# Patient Record
Sex: MALE | Race: WHITE | HISPANIC OR LATINO | Employment: UNEMPLOYED | URBAN - METROPOLITAN AREA
[De-identification: names, ages, dates, MRNs, and addresses within clinical notes are randomized per-mention and may not be internally consistent; named-entity substitution may affect disease eponyms.]

---

## 2022-02-04 ENCOUNTER — OFFICE VISIT (OUTPATIENT)
Dept: FAMILY MEDICINE CLINIC | Facility: CLINIC | Age: 32
End: 2022-02-04

## 2022-02-04 VITALS
BODY MASS INDEX: 31.86 KG/M2 | OXYGEN SATURATION: 99 % | RESPIRATION RATE: 18 BRPM | HEART RATE: 90 BPM | WEIGHT: 203 LBS | SYSTOLIC BLOOD PRESSURE: 126 MMHG | TEMPERATURE: 97.6 F | DIASTOLIC BLOOD PRESSURE: 84 MMHG | HEIGHT: 67 IN

## 2022-02-04 DIAGNOSIS — J45.30 MILD PERSISTENT ASTHMA WITHOUT COMPLICATION: ICD-10-CM

## 2022-02-04 DIAGNOSIS — L02.229 CARBUNCLE AND FURUNCLE OF TRUNK: Primary | ICD-10-CM

## 2022-02-04 DIAGNOSIS — L02.239 CARBUNCLE AND FURUNCLE OF TRUNK: Primary | ICD-10-CM

## 2022-02-04 DIAGNOSIS — M79.5 RETAINED BULLET: ICD-10-CM

## 2022-02-04 PROCEDURE — 87070 CULTURE OTHR SPECIMN AEROBIC: CPT | Performed by: NURSE PRACTITIONER

## 2022-02-04 PROCEDURE — 99204 OFFICE O/P NEW MOD 45 MIN: CPT | Performed by: NURSE PRACTITIONER

## 2022-02-04 PROCEDURE — 87205 SMEAR GRAM STAIN: CPT | Performed by: NURSE PRACTITIONER

## 2022-02-04 RX ORDER — DOXYCYCLINE HYCLATE 100 MG/1
100 CAPSULE ORAL EVERY 12 HOURS SCHEDULED
Qty: 28 CAPSULE | Refills: 0 | Status: SHIPPED | OUTPATIENT
Start: 2022-02-04 | End: 2022-02-18

## 2022-02-04 RX ORDER — CLINDAMYCIN PHOSPHATE 10 MG/G
GEL TOPICAL 2 TIMES DAILY
Qty: 30 G | Refills: 0 | Status: SHIPPED | OUTPATIENT
Start: 2022-02-04

## 2022-02-04 RX ORDER — GABAPENTIN 300 MG/1
300 CAPSULE ORAL 3 TIMES DAILY
Status: CANCELLED | OUTPATIENT
Start: 2022-02-04

## 2022-02-04 RX ORDER — ALBUTEROL SULFATE 90 UG/1
2 AEROSOL, METERED RESPIRATORY (INHALATION) EVERY 6 HOURS PRN
Qty: 18 G | Refills: 5 | Status: SHIPPED | OUTPATIENT
Start: 2022-02-04

## 2022-02-04 RX ORDER — FLUTICASONE PROPIONATE 44 MCG
2 AEROSOL WITH ADAPTER (GRAM) INHALATION 2 TIMES DAILY
Qty: 10.6 G | Refills: 2 | Status: SHIPPED | OUTPATIENT
Start: 2022-02-04

## 2022-02-04 RX ORDER — GABAPENTIN 300 MG/1
300 CAPSULE ORAL 3 TIMES DAILY PRN
Qty: 90 CAPSULE | Refills: 2 | Status: SHIPPED | OUTPATIENT
Start: 2022-02-04
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
## 2022-02-06 LAB
BACTERIA WND AEROBE CULT: NORMAL
GRAM STN SPEC: NORMAL

## 2022-02-07 NOTE — ASSESSMENT & PLAN NOTE
Historically well controlled on flovent and prn albuterol  Reorder the same  Advised to avoid triggers
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
## 2022-02-25 ENCOUNTER — TELEPHONE (OUTPATIENT)
Dept: DERMATOLOGY | Facility: CLINIC | Age: 32
End: 2022-02-25

## 2022-02-25 NOTE — TELEPHONE ENCOUNTER
Called to schedule consult from ASAP referral  Spoke with pt's mother who asked if she can call back later to schedule the appt  Confirmed our number with Diana

## 2022-04-28 ENCOUNTER — TELEPHONE (OUTPATIENT)
Dept: DERMATOLOGY | Facility: CLINIC | Age: 32
End: 2022-04-28

## 2022-05-24 ENCOUNTER — CONSULT (OUTPATIENT)
Dept: DERMATOLOGY | Facility: CLINIC | Age: 32
End: 2022-05-24
Payer: COMMERCIAL

## 2022-05-24 VITALS — TEMPERATURE: 97.7 F | WEIGHT: 183 LBS | BODY MASS INDEX: 28.72 KG/M2 | HEIGHT: 67 IN

## 2022-05-24 DIAGNOSIS — L02.229 CARBUNCLE AND FURUNCLE OF TRUNK: ICD-10-CM

## 2022-05-24 DIAGNOSIS — L02.239 CARBUNCLE AND FURUNCLE OF TRUNK: ICD-10-CM

## 2022-05-24 DIAGNOSIS — L70.1 ACNE CONGLOBATA: Primary | ICD-10-CM

## 2022-05-24 DIAGNOSIS — Z79.899 ON ACCUTANE THERAPY: ICD-10-CM

## 2022-05-24 PROCEDURE — 99243 OFF/OP CNSLTJ NEW/EST LOW 30: CPT | Performed by: DERMATOLOGY

## 2022-05-24 RX ORDER — DOXYCYCLINE 50 MG/1
50 TABLET ORAL 2 TIMES DAILY
COMMUNITY

## 2022-05-24 RX ORDER — PREDNISONE 20 MG/1
20 TABLET ORAL DAILY
Qty: 30 TABLET | Refills: 0 | Status: SHIPPED | OUTPATIENT
Start: 2022-05-24 | End: 2022-06-23

## 2022-05-24 NOTE — PATIENT INSTRUCTIONS
ACNE CONGLOBATA    Treatment plan:Based on a thorough discussion of this condition and the management approach to it (including a comprehensive discussion of the known risks, side effects and potential benefits of treatment), the patient (family) agrees to implement the following specific plan:    Oral medications:        Morning or Night: Start Prednisone 20 mg once a day by mouth for 1 month           Morning and Night: Start Isotretinoin 10 mg once a day by mouth for 1 month     Follow up in 1 month   Patient is aware a prior authorization is required for (istotretinoin)

## 2022-05-26 DIAGNOSIS — L02.229 CARBUNCLE AND FURUNCLE OF TRUNK: Primary | ICD-10-CM

## 2022-05-26 DIAGNOSIS — L02.239 CARBUNCLE AND FURUNCLE OF TRUNK: Primary | ICD-10-CM

## 2022-05-26 DIAGNOSIS — L70.1 ACNE CONGLOBATA: ICD-10-CM

## 2022-05-26 DIAGNOSIS — Z79.899 ON ACCUTANE THERAPY: ICD-10-CM

## 2022-05-26 NOTE — TELEPHONE ENCOUNTER
Prior auth started for isotretinoin (brand)  Fax with clinicals faxed to [address] [address]  [phone] and [phone]

## 2022-05-27 NOTE — TELEPHONE ENCOUNTER
Called [facility] [phone] check status on prior auth for isotretinoin  Spoke with Faviola Bangura prior auth reopened to include Myorisan as an option since absorica is not covered   He stated it can take up to 24 hours for review

## 2022-06-01 NOTE — TELEPHONE ENCOUNTER
Fax recd 5/27   Gildardo Griffith approved from 5/27/22-5/27/23     Detailed msg left on patient phone as well   Also informed him the medication will be sent to [facility] later on today tomorrow morning the latest

## 2022-06-03 ENCOUNTER — APPOINTMENT (OUTPATIENT)
Dept: LAB | Facility: HOSPITAL | Age: 32
End: 2022-06-03
Payer: COMMERCIAL

## 2022-06-06 DIAGNOSIS — L70.1 ACNE CONGLOBATA: Primary | ICD-10-CM

## 2022-06-06 RX ORDER — ISOTRETINOIN 20 MG/1
20 CAPSULE ORAL DAILY
Qty: 30 CAPSULE | Refills: 0 | Status: SHIPPED | OUTPATIENT
Start: 2022-06-06 | End: 2022-07-11

## 2022-06-06 NOTE — TELEPHONE ENCOUNTER
Spoke with pt mom Diana (consent on file) informed her the medication should be ready for   If there is an issue to let us know  She also stated the prednisone has been clearing his skin and wanted to know what you meant when you said it will get worse before it gets better     -explained to mom it was just for reassurance the worsening of the breakouts do not happen to everyone     Mom understood

## 2022-06-27 NOTE — TELEPHONE ENCOUNTER
Mother called in asking for an update again on the PA for the medication  Pharmacy keeps telling her to call the office  Please call mother and update, Thank you

## 2022-06-29 NOTE — TELEPHONE ENCOUNTER
Patients mother called in regards to patients Accutane medication stating thathe has not been able to get the medication from the pharmacy and would like to cancel the appointment  I did state that prior authorization was approved on our end and that it should be able to be picked up to verify the pharmacy has medication on hand if not we may need to send to another pharmacy  I did advise it was okay to cancel appointment since he did not start medication but would need to be seen next month  Mother confirmed understanding

## 2022-07-11 DIAGNOSIS — L70.1 ACNE CONGLOBATA: Primary | ICD-10-CM

## 2022-07-11 RX ORDER — ISOTRETINOIN 10 MG/1
10 CAPSULE, LIQUID FILLED ORAL DAILY
Qty: 30 CAPSULE | Refills: 0 | Status: SHIPPED | OUTPATIENT
Start: 2022-07-11 | End: 2022-08-10

## 2022-07-11 RX ORDER — PREDNISONE 10 MG/1
10 TABLET ORAL DAILY
Qty: 30 TABLET | Refills: 1 | Status: SHIPPED | OUTPATIENT
Start: 2022-07-11 | End: 2022-10-14 | Stop reason: SDUPTHER

## 2022-07-11 NOTE — TELEPHONE ENCOUNTER
Mother called again today and lvm:  Hi, yes, this is Diana total  I'm calling for [employer]  I really need someone to call me back at [phone]  It's regarding the medication  I called the insurance  Now the insurance tells me that I got to call the Doctor  So it's been going on two months already  We need to do something about this because he really needs to get on his medication because someone please call them on something back at [phone]  Thank you

## 2022-07-11 NOTE — TELEPHONE ENCOUNTER
Called  pharmacy and spoke with pharmacist who also verified they are getting a denial after processing myorisan  Called [facility] at 1 [phone] who stated the approval was for myorisan 10 mg daily and the prescription they are running through is for 20 mg   Dr Keisha Francisco can you sign off on the prescription on file   Updated the prescription based on chart note as well to myorisan 10 mg daily

## 2022-07-12 NOTE — TELEPHONE ENCOUNTER
Spoke with mom and informed her the prescription is ready for  as well as the prednisone   Appts also rescheduled

## 2022-08-22 ENCOUNTER — TELEPHONE (OUTPATIENT)
Dept: DERMATOLOGY | Age: 32
End: 2022-08-22

## 2022-08-22 NOTE — TELEPHONE ENCOUNTER
Called and spoke with mom  Made aware the visit for 8/24 will be virtual and not in person due to insurance   Mom understood

## 2022-08-24 ENCOUNTER — TELEPHONE (OUTPATIENT)
Dept: DERMATOLOGY | Age: 32
End: 2022-08-24

## 2022-08-24 NOTE — TELEPHONE ENCOUNTER
Call patient for virtual appointment was told by mother that patient was unavailable for vv appt he was seeing his  and can't talk on phone  Pt mother stated that they will call back to reschedule Accutane appt

## 2022-09-16 ENCOUNTER — OFFICE VISIT (OUTPATIENT)
Dept: DERMATOLOGY | Facility: CLINIC | Age: 32
End: 2022-09-16
Payer: COMMERCIAL

## 2022-09-16 DIAGNOSIS — L73.0 ACNE SCARRING: ICD-10-CM

## 2022-09-16 DIAGNOSIS — Z79.899 HIGH RISK MEDICATION USE: ICD-10-CM

## 2022-09-16 DIAGNOSIS — L02.229 CARBUNCLE AND FURUNCLE OF TRUNK: ICD-10-CM

## 2022-09-16 DIAGNOSIS — L70.1 ACNE CONGLOBATA: Primary | ICD-10-CM

## 2022-09-16 DIAGNOSIS — L85.3 XEROSIS OF SKIN: ICD-10-CM

## 2022-09-16 DIAGNOSIS — L02.239 CARBUNCLE AND FURUNCLE OF TRUNK: ICD-10-CM

## 2022-09-16 PROCEDURE — 99213 OFFICE O/P EST LOW 20 MIN: CPT | Performed by: DERMATOLOGY

## 2022-09-16 RX ORDER — BUPROPION HYDROCHLORIDE 75 MG/1
TABLET ORAL
COMMUNITY
Start: 2022-09-08

## 2022-09-16 RX ORDER — PRAZOSIN HYDROCHLORIDE 2 MG/1
CAPSULE ORAL
COMMUNITY
Start: 2022-09-08

## 2022-09-16 RX ORDER — ISOTRETINOIN 10 MG/1
10 CAPSULE, LIQUID FILLED ORAL DAILY
Qty: 30 CAPSULE | Refills: 0 | Status: CANCELLED | OUTPATIENT
Start: 2022-09-16 | End: 2022-10-16

## 2022-09-16 RX ORDER — BUSPIRONE HYDROCHLORIDE 10 MG/1
TABLET ORAL
COMMUNITY
Start: 2022-09-08

## 2022-09-16 NOTE — PROGRESS NOTES
[facility] Follow Up Note    Patient Name: Soledad Lynn  Encounter Date: 09/16/22      Today's Chief Concerns:  Noelle Horvath Concern #1:  Isotretinoin       Current Medications:    Current Outpatient Medications:     albuterol (Ventolin HFA) 90 mcg/act inhaler, Inhale 2 puffs every 6 (six) hours as needed for wheezing, Disp: 18 g, Rfl: 5    buPROPion (WELLBUTRIN) 75 mg tablet, , Disp: , Rfl:     busPIRone (BUSPAR) 10 mg tablet, , Disp: , Rfl:     fluticasone (Flovent HFA) 44 mcg/act inhaler, Inhale 2 puffs 2 (two) times a day Rinse mouth after use  (Patient not taking: Reported on 5/24/2022), Disp: 10 6 g, Rfl: 2    gabapentin (Neurontin) 300 mg capsule, Take 1 capsule (300 mg total) by mouth 3 (three) times a day as needed (pain) (Patient not taking: Reported on 5/24/2022), Disp: 90 capsule, Rfl: 2    Myorisan 10 MG capsule, Take 1 capsule (10 mg total) by mouth daily, Disp: 30 capsule, Rfl: 0    prazosin (MINIPRESS) 2 mg capsule, , Disp: , Rfl:     predniSONE 10 mg tablet, Take 1 tablet (10 mg total) by mouth daily, Disp: 30 tablet, Rfl: 1    CONSTITUTIONAL:   There were no vitals filed for this visit  Specific Alerts:    Have you been seen by a [facility]atologist in the last 3 years? YES    No Known Allergies    May we call your Preferred Phone number to discuss your specific medical information? YES    May we leave a detailed message that includes your specific medical information? YES    Have you traveled outside of the [facility] in the past 3 months? No    Do you currently have a pacemaker or defibrillator? No    Do you have any artificial heart valves, joints, plates, screws, rods, stents, pins, etc? YES    Do you require any medications prior to a surgical procedure? No    Are you taking any medications that cause you to bleed more easily ("blood thinners") No    Have you ever experienced a rapid heartbeat with epinephrine?  No      Review of Systems:  Have you recently had or currently have any of the following? · Fever or chills: No  · Night Sweats: No  · Headaches: No  · Weight Gain: No  · Weight Loss: No  · Blurry Vision: No  · Nausea: No  · Vomiting: No  · Diarrhea: No  · Blood in Stool: No  · Abdominal Pain: No  · Itchy Skin: YES  · Painful Joints: No  · Swollen Joints: No  · Muscle Pain: No  · Irregular Mole: No  · Sun Burn: No  · Dry Skin: YES  · Skin Color Changes: No  · Scar or Keloid: No  · Cold Sores/Fever Blisters: No  · Bacterial Infections/MRSA: No  · Anxiety: No  · Depression: No  · Suicidal or Homicidal Thoughts: No      PSYCH: Normal mood and affect  EYES: Normal conjunctiva  ENT: Normal lips and oral mucosa  CARDIOVASCULAR: No edema  RESPIRATORY: Normal respirations  HEME/LYMPH/IMMUNO:  No regional lymphadenopathy except as noted below in ASSESSMENT AND PLAN BY DIAGNOSIS    FULL ORGAN SYSTEM SKIN EXAM (SKIN)   Hair, Scalp, Ears, Face Normal except as noted below in Assessment   Neck, Cervical Chain Nodes Normal except as noted below in Assessment   Right Arm/Hand/Fingers Normal except as noted below in Assessment   Left Arm/Hand/Fingers Normal except as noted below in Assessment   Chest/Breasts/Axillae Viewed areas Normal except as noted below in Assessment                           ISOTRETINOIN "ACCUTANE": MALE    Age:32 y o    Weight:     Ipledge number: [id number]   Last 4 digits SS: [id number]      "Goal Dose" in mg (125 mg x weight in kg): 10,375 mg       Interim month   "Daily Dose" of Isotretinoin the patient has actually been taking since last visit (this is usually what was prescribed): 10 mg   "Total # of Days" patient took Isotretinoin since last visit (this is usually 30):  30 days   "Total Monthly Dose" in mg since last visit (this equals "Daily Dose" x "Total # of Days"): 300 mg    Cumulative dosage   "Total cumulative Dose" in mg (add the above "Total Monthly Dose" with "Total Cumulative Dose" from last visit: 300 mg        Symptoms   Conjunctivitis: No   Night Blindness/ Issues with night vision: No   Focusing Problems: No   Dry Lips/Eyes: No   Dry Skin: No   Rash: No   Nose Bleeds: No   Angular cheilitis (cracking in corner of lips): No   Headaches: No   Photosensitivity: No   Dry Anus: No   Depression: No   Mood Changes: No   Suicidal thoughts: No   Fatigue: No   Weight Loss: No   Muscle Pain/Stiffness: No   Abdominal pain: No   Diarrhea: No   Bloody stools: No         Physical Exam   Psychiatric/Mood: Normal   Anatomic Location Affected:  Face; chest and back    Morphological Description:                      -    Pertinent Positives:   Pertinent Negatives:       Labs   Date of last labs: 06/03/2022   Completed: YES   Labs reviewed: within normal limits      Additional History of Present Condition:  Patient is present to re- establish isotretinoin treatment   Has been on prednisone 10 mg, possibly intermittently  DIAGNOSES:     Acne Vulgaris   High Risk Medication   Medication Monitoring   Xerosis (see below for patient education)    Assessment and Plan:   Target Total Dose per KILOgram:  125 mg/KILOgram (this may change this on a patient-by-patient basis)   Planned daily dose for next 30 days: 10 mg   Return to clinic in about 1 month, please review ipledge guidelines in terms of timing (see below for patient education)   Get labs in 2 weeks then 1-2 days before next appointment: YES    Patient confirmed in iPledge: YES   Patients counseled:  - Cannot donate blood while taking isotretinoin and for 1 month after completing therapy  YES  - Do not share medication with anyone   YES  - Possible side effects discussed, including sun sensitivity, dry lips/eyes/skin, headaches, blurry vision (pseudotumor cerebri), muscle/joint aches, GI upset, bloody stools (IBD including Crohns/Ulcerative Colitis), jaundice, liver dysfunction, lipid abnormalities, bone marrow dysfunction, mood effects, thoughts of hurting oneself or others, and flaring of acne (acne fulminans/SAPPHO)  YES  - Report any side effects of mood swings or depression and stop medication upon occurrence  YES      ORAL ISOTRETINOIN (used to be called the brand name Charlottelaurence Pozo)  Isotretinoin, a derivative of vitamin A, is a powerful drug with several significant potential side effects  It is reserved for acne which is severe or when other medications have not worked well enough  It used to be sold under the brand name AldPetbrosiar but now several versions exist     Isotretinoin for Acne   Isotretinoin, a derivative of vitamin A, is a retinoid medication that is taken by mouth to treat severe nodular acne  Typically, it is used once other acne treatments have not worked, such as oral antibiotics  Isotretinoin is powerful drug with several significant potential side effects  It used to be sold under the brand name AldPetbrosiar but now several versions exist  Usually isotretinoin is taken for 4 to 6 months, although the length of treatment can vary from person to person  While most patients acne improves and may even clear with this medication, in 20% of patients acne can come back  This requires additional acne treatment or even a second cycle of isotretinoin  How should I take isotretinoin?  Isotretinoin dosing is weight-based and should be taken exactly as prescribed   If you miss a dose, skip that dose  Do not take 2 doses at the same time   Take with food to help with absorption   All instructions in the iPLEDGE program packet (www [employer]) that was provided must be followed (see below for highlights)   You will get a 30 day supply of isotretinoin at a time  It cannot be automatically refilled  Make certain that you have been given enough medication to last 30 days as pharmacists are unable to refill or make changes within a month     You must return to your dermatologist every month to make sure you are not having any serious side effects from isotretinoin  For female patients, this visit will always include a monthly pregnancy test  Other laboratory studies, including liver function tests, cholesterol and triglycerides, must also be conducted before and during treatment  What should I avoid while taking isotretinoin?  Do not donate blood while take isotretinoin or until 1 months after coming off the medication   Do not have cosmetic procedures to smooth your skin, including waxing, dermabrasion, or laser procedures, while taking this medication and for at least 6 months after you stop   Do not share isotretinoin with any other people  It can cause birth defects and other serious health problems   Do not use any other acne medications, including medicated washes, cleansers, creams or antibiotic pills during your treatment with isotretinoin unless expressly directed to by your dermatologist      Initiating isotretinoin & the iPLEDGE Program    The iPLEDGE Program is a strict, government-required program to prevent females from becoming pregnant while on isotretinoin  All females and males must participate  Note: Your provider must follow this program and cannot change any of the requirements   Before starting isotretinoin, your provider will talk to you about the safe use of this medication and you will need to sign consent forms in order to receive treatment   If you fail to keep appointments, you will be unable to get your prescription filled   For male patients and women of non-childbearing age: There is no waiting period  Once laboratory tests are done, treatment can start   For more information, visit: [email or web address]    What are the possible side effects of isotretinoin? What should I do? Most side effects from isotretinoin are mild and can be easily improved with simple remedies  Others are more concerning      Dry skin and eyes, chapped lips and dry nose that may lead to nosebleeds  o Dry Skin: Apply sensitive skin moisturizers to dry skin at least two times a day  You may need sunscreen (SPF 30) in the morning and to reapply when outside  o Dry Eyes: Use saline eye drops or artificial tears  o Dry Nose/Nosebleeds: Use saline nasal spray (ex  Ayr) during the day and at bedtime  To stop nosebleeds, hold pressure  If this does not work, call your dermatologist     o Chapped lips: apply petrolatum-based lip balms routinely  Avoid anything medicated   Contact your dermatologist for excessive dryness, cracks, tenderness or pain   Increased blood fats and cholesterol (usually in patients with a personal or family history of cholesterol or triglyceride problems)   Vision problems affecting your ability to see in the dark and drive at night   Bone, muscle and tendon aches can occur  Additional stretching before and after activities may help relieve aches  If you are otherwise healthy, consider the use of ibuprofen or naproxen  If you are unsure if you can use these pain medications, ask your doctor first  Also, call your doctor if you experience severe back pain, joint pain, or a broken bone    Changes in mood, including anxiety, depressive symptoms or suicidal thoughts which may or may not be temporary  Notify your doctor if your or a family member have suffered from these conditions or if you have any concerns during treatment   Serious birth defects or miscarriage can occur while taking this medication and for one month after taking your last dose of isotretinoin  You must not get pregnant while taking isotretinoin  Once the medication is out of your system, 30 days, there is no effect on the baby   Increased pressure in the brain  Call your doctor right away if you experience bad headache, blurred vision, dizziness, nausea or vomiting, or seizures      Skin rash - call your doctor right away if you develop any rashes or blisters on the skin   Liver damage - call your doctor right away if you experience severe stomach, chest or bowel pain, painful swallowing, diarrhea, blood in your stool, yellowing of your skin or eyes, or dark urine   Gastrointestinal bleeding  If you experience unusual abdominal pain or red or black/tarry stools, call your doctor immediately  You should also notify your doctor if you or a family member has a history of ulcerative colitis or Crohns disease   Worsening acne  Mild worsening of acne can occur in the first few weeks of using isotreinoin  If your acne is getting significantly worse, call your doctor  This may require temporarily stopping the isotretinoin and possibly adding other medications  XEROSIS ("DRY SKIN")    Dry skin refers to skin that feels dry to touch  Dry skin has a dull surface with a rough, scaly quality  The skin is less pliable and cracked  When dryness is severe, the skin may become inflamed and fissured  Although any body site can be dry, dry skin tends to affect the shins more than any other site  Dry skin is lacking moisture in the outer horny cell layer (stratum corneum) and this results in cracks in the skin surface  Dry skin is also called xerosis, xeroderma or asteatosis (lack of fat)  It can affect males and females of all ages  There is some racial variability in water and lipid content of the skin   Dry skin that starts in early childhood may be one of about 20 types of ichthyosis (fish-scale skin)  There is often a family history of dry skin   Dry skin is commonly seen in people with atopic dermatitis   Nearly everyone > 60 years has dry skin  Dry skin that begins later may be seen in people with certain diseases and conditions     Postmenopausal women   Hypothyroidism   Chronic renal disease    Malnutrition and weight loss    Subclinical dermatitis    Treatment with certain drugs such as oral retinoids, diuretics and epidermal growth factor receptor inhibitors    People exposed to a dry environment may experience dry skin   Low humidity: in desert climates or cool, windy conditions    Excessive air conditioning    Direct heat from a fire or fan heater    Excessive bathing    Contact with soap, detergents and solvents    Inappropriate topical agents such as alcohol    Frictional irritation from rough clothing or abrasives    Dry skin is due to abnormalities in the integrity of the barrier function of the stratum corneum, which is made up of corneocytes   There is an overall reduction in the lipids in the stratum corneum   Ratio of ceramides, cholesterol and free fatty acids may be normal or altered   There may be a reduction in the proliferation of keratinocytes   Keratinocyte subtypes change in dry skin with a decrease in keratins K1, K10 and increase in K5, K14     Involucrin (a protein) may be expressed early, increasing cell stiffness   The result is retention of corneocytes and reduced water-holding capacity  What is the treatment for dry skin? The mainstay of treatment of dry skin and ichthyosis is moisturisers/emollients  They should be applied liberally and often enough to:   Reduce itch    Improve the barrier function    Prevent entry of irritants, bacteria    Reduce transepidermal water loss  When considering which emollient is most suitable, consider:   Severity of the dryness    Tolerance    Personal preference    Cost and availability  Emollients generally work best if applied to damp skin, if pH is below 7 (acidic), and if containing humectants such as urea or propylene glycol  Additional treatments include:   Topical steroid if itchy or there is dermatitis -- choose an emollient base    Topical calcineurin inhibitors if topical steroids are unsuitable  How can dry skin be prevented? Eliminate aggravating factors:   Reduce the frequency of bathing      A humidifier in winter and air conditioner in summer    Compare having a short shower with a prolonged soak in a bath   Use lukewarm, not hot, water   Replace standard soap with a substitute such as a synthetic detergent cleanser, water-miscible emollient, bath oil, anti-pruritic tar oil, colloidal oatmeal etc     Apply an emollient liberally and often, particularly shortly after bathing, and when itchy  The drier the skin, the thicker this should be, especially on the hands  What is the outlook for dry skin? A tendency to dry skin may persist life-long, or it may improve once contributing factors are controlled      Scribe Attestation    I,:  Angeles Rasmussen MA am acting as a scribe while in the presence of the attending physician :       I,:  Everton Lazcano MD personally performed the services described in this documentation    as scribed in my presence :

## 2022-09-16 NOTE — PATIENT INSTRUCTIONS
Assessment and Plan:  Target Total Dose per KILOgram:  125 mg/KILOgram (this may change this on a patient-by-patient basis)  Planned daily dose for next 30 days: 10 mg  Return to clinic in about 1 month, please review ipledge guidelines in terms of timing (see below for patient education)  Get labs in 2 weeks then 1-2 days before next appointment: YES   Patient confirmed in iPledge: YES  Patients counseled:  Cannot donate blood while taking isotretinoin and for 1 month after completing therapy  YES  Do not share medication with anyone  YES  Possible side effects discussed, including sun sensitivity, dry lips/eyes/skin, headaches, blurry vision (pseudotumor cerebri), muscle/joint aches, GI upset, bloody stools (IBD including Crohns/Ulcerative Colitis), jaundice, liver dysfunction, lipid abnormalities, bone marrow dysfunction, mood effects, thoughts of hurting oneself or others, and flaring of acne (acne fulminans/SAPPHO)  YES  Report any side effects of mood swings or depression and stop medication upon occurrence  YES      ORAL ISOTRETINOIN (used to be called the brand name Shopperception)  Isotretinoin, a derivative of vitamin A, is a powerful drug with several significant potential side effects  It is reserved for acne which is severe or when other medications have not worked well enough  It used to be sold under the brand name [employer] but now several versions exist     Isotretinoin for Acne   Isotretinoin, a derivative of vitamin A, is a retinoid medication that is taken by mouth to treat severe nodular acne  Typically, it is used once other acne treatments have not worked, such as oral antibiotics  Isotretinoin is powerful drug with several significant potential side effects  It used to be sold under the brand name [employer] but now several versions exist  Usually isotretinoin is taken for 4 to 6 months, although the length of treatment can vary from person to person    While most patients acne improves and may even clear with this medication, in 20% of patients acne can come back  This requires additional acne treatment or even a second cycle of isotretinoin  How should I take isotretinoin? Isotretinoin dosing is weight-based and should be taken exactly as prescribed  If you miss a dose, skip that dose  Do not take 2 doses at the same time  Take with food to help with absorption  All instructions in the iPLEDGE program packet (www [employer]) that was provided must be followed (see below for highlights)  You will get a 30 day supply of isotretinoin at a time  It cannot be automatically refilled  Make certain that you have been given enough medication to last 30 days as pharmacists are unable to refill or make changes within a month  You must return to your dermatologist every month to make sure you are not having any serious side effects from isotretinoin  For female patients, this visit will always include a monthly pregnancy test  Other laboratory studies, including liver function tests, cholesterol and triglycerides, must also be conducted before and during treatment  What should I avoid while taking isotretinoin? Do not donate blood while take isotretinoin or until 1 months after coming off the medication  Do not have cosmetic procedures to smooth your skin, including waxing, dermabrasion, or laser procedures, while taking this medication and for at least 6 months after you stop  Do not share isotretinoin with any other people  It can cause birth defects and other serious health problems  Do not use any other acne medications, including medicated washes, cleansers, creams or antibiotic pills during your treatment with isotretinoin unless expressly directed to by your dermatologist      Initiating isotretinoin & the iPLEDGE Program   The [facility] is a strict, government-required program to prevent females from becoming pregnant while on isotretinoin   All females and males must participate  Note: Your provider must follow this program and cannot change any of the requirements  Before starting isotretinoin, your provider will talk to you about the safe use of this medication and you will need to sign consent forms in order to receive treatment  If you fail to keep appointments, you will be unable to get your prescription filled  For male patients and women of non-childbearing age: There is no waiting period  Once laboratory tests are done, treatment can start  For more information, visit: [email or web address]    What are the possible side effects of isotretinoin? What should I do? Most side effects from isotretinoin are mild and can be easily improved with simple remedies  Others are more concerning  Dry skin and eyes, chapped lips and dry nose that may lead to nosebleeds  Dry Skin: Apply sensitive skin moisturizers to dry skin at least two times a day  You may need sunscreen (SPF 30) in the morning and to reapply when outside  Dry Eyes: Use saline eye drops or artificial tears  Dry Nose/Nosebleeds: Use saline nasal spray (ex  Ayr) during the day and at bedtime  To stop nosebleeds, hold pressure  If this does not work, call your dermatologist     Chapped lips: apply petrolatum-based lip balms routinely  Avoid anything medicated   Contact your dermatologist for excessive dryness, cracks, tenderness or pain  Increased blood fats and cholesterol (usually in patients with a personal or family history of cholesterol or triglyceride problems)  Vision problems affecting your ability to see in the dark and drive at night  Bone, muscle and tendon aches can occur  Additional stretching before and after activities may help relieve aches  If you are otherwise healthy, consider the use of ibuprofen or naproxen    If you are unsure if you can use these pain medications, ask your doctor first  Also, call your doctor if you experience severe back pain, joint pain, or a broken bone   Changes in mood, including anxiety, depressive symptoms or suicidal thoughts which may or may not be temporary  Notify your doctor if your or a family member have suffered from these conditions or if you have any concerns during treatment  Serious birth defects or miscarriage can occur while taking this medication and for one month after taking your last dose of isotretinoin  You must not get pregnant while taking isotretinoin  Once the medication is out of your system, 30 days, there is no effect on the baby  Increased pressure in the brain  Call your doctor right away if you experience bad headache, blurred vision, dizziness, nausea or vomiting, or seizures  Skin rash - call your doctor right away if you develop any rashes or blisters on the skin  Liver damage - call your doctor right away if you experience severe stomach, chest or bowel pain, painful swallowing, diarrhea, blood in your stool, yellowing of your skin or eyes, or dark urine  Gastrointestinal bleeding  If you experience unusual abdominal pain or red or black/tarry stools, call your doctor immediately  You should also notify your doctor if you or a family member has a history of ulcerative colitis or Crohns disease  Worsening acne  Mild worsening of acne can occur in the first few weeks of using isotreinoin  If your acne is getting significantly worse, call your doctor  This may require temporarily stopping the isotretinoin and possibly adding other medications  XEROSIS ("DRY SKIN")    Dry skin refers to skin that feels dry to touch  Dry skin has a dull surface with a rough, scaly quality  The skin is less pliable and cracked  When dryness is severe, the skin may become inflamed and fissured  Although any body site can be dry, dry skin tends to affect the shins more than any other site      Dry skin is lacking moisture in the outer horny cell layer (stratum corneum) and this results in cracks in the skin surface  Dry skin is also called xerosis, xeroderma or asteatosis (lack of fat)  It can affect males and females of all ages  There is some racial variability in water and lipid content of the skin  Dry skin that starts in early childhood may be one of about 20 types of ichthyosis (fish-scale skin)  There is often a family history of dry skin  Dry skin is commonly seen in people with atopic dermatitis  Nearly everyone > 60 years has dry skin  Dry skin that begins later may be seen in people with certain diseases and conditions  Postmenopausal women  Hypothyroidism  Chronic renal disease   Malnutrition and weight loss   Subclinical dermatitis   Treatment with certain drugs such as oral retinoids, diuretics and epidermal growth factor receptor inhibitors    People exposed to a dry environment may experience dry skin  Low humidity: in desert climates or cool, windy conditions   Excessive air conditioning   Direct heat from a fire or fan heater   Excessive bathing   Contact with soap, detergents and solvents   Inappropriate topical agents such as alcohol   Frictional irritation from rough clothing or abrasives    Dry skin is due to abnormalities in the integrity of the barrier function of the stratum corneum, which is made up of corneocytes  There is an overall reduction in the lipids in the stratum corneum  Ratio of ceramides, cholesterol and free fatty acids may be normal or altered  There may be a reduction in the proliferation of keratinocytes  Keratinocyte subtypes change in dry skin with a decrease in keratins K1, K10 and increase in K5, K14  Involucrin (a protein) may be expressed early, increasing cell stiffness  The result is retention of corneocytes and reduced water-holding capacity  What is the treatment for dry skin? The mainstay of treatment of dry skin and ichthyosis is moisturisers/emollients   They should be applied liberally and often enough to:  Reduce itch   Improve the barrier function   Prevent entry of irritants, bacteria   Reduce transepidermal water loss  When considering which emollient is most suitable, consider:  Severity of the dryness   Tolerance   Personal preference   Cost and availability  Emollients generally work best if applied to damp skin, if pH is below 7 (acidic), and if containing humectants such as urea or propylene glycol  Additional treatments include:  Topical steroid if itchy or there is dermatitis -- choose an emollient base   Topical calcineurin inhibitors if topical steroids are unsuitable  How can dry skin be prevented? Eliminate aggravating factors:  Reduce the frequency of bathing  A humidifier in winter and air conditioner in summer   Compare having a short shower with a prolonged soak in a bath  Use lukewarm, not hot, water  Replace standard soap with a substitute such as a synthetic detergent cleanser, water-miscible emollient, bath oil, anti-pruritic tar oil, colloidal oatmeal etc    Apply an emollient liberally and often, particularly shortly after bathing, and when itchy  The drier the skin, the thicker this should be, especially on the hands  What is the outlook for dry skin? A tendency to dry skin may persist life-long, or it may improve once contributing factors are controlled

## 2022-09-19 ENCOUNTER — APPOINTMENT (OUTPATIENT)
Dept: LAB | Facility: HOSPITAL | Age: 32
End: 2022-09-19
Attending: DERMATOLOGY
Payer: COMMERCIAL

## 2022-09-19 DIAGNOSIS — Z79.899 HIGH RISK MEDICATION USE: ICD-10-CM

## 2022-09-19 DIAGNOSIS — L85.3 XEROSIS OF SKIN: ICD-10-CM

## 2022-09-19 DIAGNOSIS — L70.1 ACNE CONGLOBATA: ICD-10-CM

## 2022-09-19 LAB
ALBUMIN SERPL BCP-MCNC: 4 G/DL (ref 3.5–5)
ALP SERPL-CCNC: 85 U/L (ref 43–122)
ALT SERPL W P-5'-P-CCNC: 10 U/L
ANION GAP SERPL CALCULATED.3IONS-SCNC: 8 MMOL/L (ref 5–14)
AST SERPL W P-5'-P-CCNC: 20 U/L (ref 17–59)
BASOPHILS # BLD AUTO: 0.03 THOUSANDS/ΜL (ref 0–0.1)
BASOPHILS NFR BLD AUTO: 0 % (ref 0–1)
BILIRUB SERPL-MCNC: 0.38 MG/DL (ref 0.2–1)
BUN SERPL-MCNC: 10 MG/DL (ref 5–25)
CALCIUM SERPL-MCNC: 9 MG/DL (ref 8.4–10.2)
CHLORIDE SERPL-SCNC: 105 MMOL/L (ref 96–108)
CHOLEST SERPL-MCNC: 117 MG/DL
CO2 SERPL-SCNC: 28 MMOL/L (ref 21–32)
CREAT SERPL-MCNC: 0.76 MG/DL (ref 0.7–1.5)
EOSINOPHIL # BLD AUTO: 0.22 THOUSAND/ΜL (ref 0–0.61)
EOSINOPHIL NFR BLD AUTO: 2 % (ref 0–6)
ERYTHROCYTE [DISTWIDTH] IN BLOOD BY AUTOMATED COUNT: 15.4 % (ref 11.6–15.1)
GFR SERPL CREATININE-BSD FRML MDRD: 122 ML/MIN/1.73SQ M
GLUCOSE P FAST SERPL-MCNC: 104 MG/DL (ref 70–99)
HCT VFR BLD AUTO: 41 % (ref 36.5–49.3)
HDLC SERPL-MCNC: 24 MG/DL
HGB BLD-MCNC: 12.7 G/DL (ref 12–17)
IMM GRANULOCYTES # BLD AUTO: 0.03 THOUSAND/UL (ref 0–0.2)
IMM GRANULOCYTES NFR BLD AUTO: 0 % (ref 0–2)
LDLC SERPL CALC-MCNC: 66 MG/DL
LYMPHOCYTES # BLD AUTO: 2.48 THOUSANDS/ΜL (ref 0.6–4.47)
LYMPHOCYTES NFR BLD AUTO: 27 % (ref 14–44)
MCH RBC QN AUTO: 27.5 PG (ref 26.8–34.3)
MCHC RBC AUTO-ENTMCNC: 31 G/DL (ref 31.4–37.4)
MCV RBC AUTO: 89 FL (ref 82–98)
MONOCYTES # BLD AUTO: 0.67 THOUSAND/ΜL (ref 0.17–1.22)
MONOCYTES NFR BLD AUTO: 7 % (ref 4–12)
NEUTROPHILS # BLD AUTO: 5.76 THOUSANDS/ΜL (ref 1.85–7.62)
NEUTS SEG NFR BLD AUTO: 64 % (ref 43–75)
NONHDLC SERPL-MCNC: 93 MG/DL
NRBC BLD AUTO-RTO: 0 /100 WBCS
PLATELET # BLD AUTO: 314 THOUSANDS/UL (ref 149–390)
PMV BLD AUTO: 9.1 FL (ref 8.9–12.7)
POTASSIUM SERPL-SCNC: 3.7 MMOL/L (ref 3.5–5.3)
PROT SERPL-MCNC: 8.5 G/DL (ref 6.4–8.4)
RBC # BLD AUTO: 4.62 MILLION/UL (ref 3.88–5.62)
SODIUM SERPL-SCNC: 141 MMOL/L (ref 135–147)
TRIGL SERPL-MCNC: 133 MG/DL
WBC # BLD AUTO: 9.19 THOUSAND/UL (ref 4.31–10.16)

## 2022-09-19 PROCEDURE — 80061 LIPID PANEL: CPT

## 2022-09-19 PROCEDURE — 36415 COLL VENOUS BLD VENIPUNCTURE: CPT

## 2022-09-19 PROCEDURE — 85025 COMPLETE CBC W/AUTO DIFF WBC: CPT

## 2022-09-19 PROCEDURE — 80053 COMPREHEN METABOLIC PANEL: CPT

## 2022-10-14 ENCOUNTER — OFFICE VISIT (OUTPATIENT)
Dept: DERMATOLOGY | Facility: CLINIC | Age: 32
End: 2022-10-14
Payer: COMMERCIAL

## 2022-10-14 DIAGNOSIS — Z79.899 ON ACCUTANE THERAPY: ICD-10-CM

## 2022-10-14 DIAGNOSIS — L02.239 CARBUNCLE AND FURUNCLE OF TRUNK: ICD-10-CM

## 2022-10-14 DIAGNOSIS — L70.1 ACNE CONGLOBATA: Primary | ICD-10-CM

## 2022-10-14 DIAGNOSIS — L02.229 CARBUNCLE AND FURUNCLE OF TRUNK: ICD-10-CM

## 2022-10-14 PROCEDURE — 99213 OFFICE O/P EST LOW 20 MIN: CPT | Performed by: DERMATOLOGY

## 2022-10-14 RX ORDER — ISOTRETINOIN 10 MG/1
10 CAPSULE, LIQUID FILLED ORAL DAILY
Qty: 30 CAPSULE | Refills: 0 | Status: SHIPPED | OUTPATIENT
Start: 2022-10-14 | End: 2022-11-13

## 2022-10-14 RX ORDER — PREDNISONE 10 MG/1
TABLET ORAL
Qty: 21 TABLET | Refills: 0 | Status: SHIPPED | OUTPATIENT
Start: 2022-10-14 | End: 2022-11-04

## 2022-10-14 NOTE — PROGRESS NOTES
[facility] Follow Up Note    Patient Name: Caryn Anglin  Encounter Date: 10/14/2022    Today's Chief Concerns:  • Concern #1:  Accutane follow up     Current Medications:    Current Outpatient Medications:   •  albuterol (Ventolin HFA) 90 mcg/act inhaler, Inhale 2 puffs every 6 (six) hours as needed for wheezing, Disp: 18 g, Rfl: 5  •  buPROPion (WELLBUTRIN) 75 mg tablet, , Disp: , Rfl:   •  busPIRone (BUSPAR) 10 mg tablet, , Disp: , Rfl:   •  fluticasone (Flovent HFA) 44 mcg/act inhaler, Inhale 2 puffs 2 (two) times a day Rinse mouth after use  (Patient not taking: Reported on 5/24/2022), Disp: 10 6 g, Rfl: 2  •  gabapentin (Neurontin) 300 mg capsule, Take 1 capsule (300 mg total) by mouth 3 (three) times a day as needed (pain) (Patient not taking: Reported on 5/24/2022), Disp: 90 capsule, Rfl: 2  •  Myorisan 10 MG capsule, Take 1 capsule (10 mg total) by mouth daily, Disp: 30 capsule, Rfl: 0  •  prazosin (MINIPRESS) 2 mg capsule, , Disp: , Rfl:   •  predniSONE 10 mg tablet, Take 1 tablet (10 mg total) by mouth daily, Disp: 30 tablet, Rfl: 1    CONSTITUTIONAL:   There were no vitals filed for this visit  Specific Alerts:    Have you been seen by a [facility]atologist in the last 3 years? YES    No Known Allergies    May we call your Preferred Phone number to discuss your specific medical information? YES    May we leave a detailed message that includes your specific medical information? YES    Have you traveled outside of the [facility] in the past 3 months? No    FULL ORGAN SYSTEM SKIN EXAM (SKIN)    Face Normal except as noted below in Assessment         1  ISOTRETINOIN "ACCUTANE" - Male    Age: 29 y o    Weight (in KILOgrams): 83 Kg    Ipledge number: [id number]  Last 4 digits SS: [id number]      "Goal Dose" in mg (125-150 mg x weight in kg): 10,375 mg - 12,450 mg    Interim month  • "Daily Dose" of Isotretinoin the patient has actually been taking since last visit (this is usually what was prescribed): 0 mg  • "Total # of Days" patient took Isotretinoin since last visit (this is usually 30):  0 days  • "Total Monthly Dose" in mg since last visit (this equals "Daily Dose" x "Total # of Days"): 0 mg    Cumulative dosage  • "Total cumulative Dose" in mg (add the above "Total Monthly Dose" with "Total Cumulative Dose" from last visit: 0 mg  • Past cumulative dose 300 mg  Patient was prescribed 10mg daily for one month (July-August 2022) and was unable to attend follow up appointment due to concurrent appointment with his    Patient reports his last doses of isotretinoin were taken in early September (was not taking every day due to side effects which were improved once he was started on concurrent prednisone 10mg)         Symptoms  • Conjunctivitis: No  • Night Blindness/ Issues with night vision: No  • Focusing Problems: No  • Dry Lips/Eyes: No  • Dry Skin: No  • Rash: No  • Nose Bleeds: No  • Angular cheilitis (cracking in corner of lips): No  • Headaches: No  • Photosensitivity: No  • Dry Anus: No  • Depression: No  • Mood Changes: No  • Suicidal thoughts: No  • Fatigue: No  • Weight Loss: No  • Muscle Pain/Stiffness: No   • Abdominal pain: No  • Diarrhea: No  • Bloody stools: No    Physical Exam  • Anatomic Location Affected:  face, chest, back  • Morphological Description: open and closed comedones, erythematous papules, pustules, nodules, erythema       Labs    No results found for: CHOL  Lab Results   Component Value Date    HDL 24 (L) 09/19/2022    HDL 34 (L) 06/03/2022     Lab Results   Component Value Date    LDLCALC 66 09/19/2022    LDLCALC 72 06/03/2022     Lab Results   Component Value Date    TRIG 133 09/19/2022    TRIG 98 06/03/2022     No results found for: [employer], [place]    Lab Results   Component Value Date    ALT 10 09/19/2022    AST 20 09/19/2022    ALKPHOS 85 09/19/2022       No results found for: PREGTESTUR, PREGSERUM, HCG, HCGQUANT    DIAGNOSES:    • Acne Vulgaris  • High Risk Medication - on Accutane therapy   • Xerosis (see below for patient education)    Assessment and Plan:    Acne vulgaris, on isotretinoin   • Planned daily dose for next 30 days: 10 mg  - Confirmed patient status and counseling in iPLEDGE today  - Gentle skin care and regular SPF use  Stop all other acne treatments while on isotretinoin   -Recommend daily emollient for lips   - Side effects including but not limited to serious allergic reaction, thrombosis, mood changes, skeletal hyperostosis, premature epiphyseal closure, IBD, bone and muscle pain, HA, hyperlipidemia, photosensitivity, hepatotoxicity, dry skin and eyes, hair loss, teratogenicity (pregnancy category X) risk for up to 1 month after stopping medicine, 20-30% risk of recurrence were reviewed  - The patient understands not to donate blood, drink excessive amounts of alcohol, or share the medication with any individuals   - LFTs and lipid panel next due 11/14/2022  Follow up in 1 month    · Patient is re-starting Accutane after having labs done in September  · Discussed continuing to take Prednisone 10 mg daily for another 21 days  Take with food and water with the Accutane 10 mg     • Patients counseled:  - Cannot donate blood while taking isotretinoin and for 1 month after completing therapy  YES  - Do not share medication with anyone  YES  - Report any side effects of mood swings or depression and stop medication upon occurrence  YES  - Patient needs to confirm counseling in iPledge prior to picking up prescription  YES              Isotretinoin for Acne   Isotretinoin is a retinoid medication that is taken by mouth to treat severe nodular acne  Typically, it is used once other acne treatments have not worked, such as oral antibiotics  Usually isotretinoin is taken for 4 to 6 months, although the length of treatment can vary from person to person    While most patient’s acne improves and may even clear with this medication, in 20% of patients acne can come back  This requires additional acne treatment or even a second cycle of isotretinoin  How should I take isotretinoin? • Isotretinoin dosing is weight-based and should be taken exactly as prescribed  • If you miss a dose, skip that dose  Do not take 2 doses at the same time  • Take with fatty food to help with absorption  • You will get a 30 day supply of isotretinoin at a time  It cannot be automatically refilled  Make certain that you have been given enough medication to last 30 days as pharmacists are unable to refill or make changes within a month  • You must return to your dermatologist every month to make sure you are not having any serious side effects from isotretinoin  For female patients, this visit will always include a monthly pregnancy test  Other laboratory studies, including liver function tests, cholesterol and triglycerides, must also be conducted before and during treatment  What should I avoid while taking isotretinoin? • Do not get pregnant from one month prior or 1 month following taking any isotretinoin  • Do not donate blood while take isotretinoin or until 1 months after coming off the medication  • Do not have cosmetic procedures to smooth your skin, including waxing, dermabrasion, or laser procedures, while taking this medication and for at least 6 months after you stop  • Do not share isotretinoin with any other people  It can cause birth defects and other serious health problems  • Do not use any other acne medications, including medicated washes, cleansers, creams or antibiotic pills during your treatment with isotretinoin unless expressly directed to by your dermatologist      Initiating isotretinoin & the iPLEDGE Program   • The iPLEDGE Program is a strict, government-required program to prevent females from becoming pregnant while on isotretinoin  All females and males must participate   Note: Your provider must follow this program and cannot change any of the requirements  • If you fail to keep appointments, you will be unable to get your prescription filled  • For females of childbearing age:      o You must be on two specific forms of birth control   o You must answer a series of questions either online or by phone every month prior to getting the prescription  o Monthly prescriptions must be filled within 7 days of that month's pregnancy test   It is important that you notify your physician well before the 7th day if there are any unforeseen delays, such as prior authorizations  o For more information, visit: [email or web address]    What are the possible side effects of isotretinoin? What should I do? Most side effects from isotretinoin are mild and can be easily improved with simple remedies  Others are more concerning  • Dry skin and eyes, chapped lips and dry nose that may lead to nosebleeds  o Dry Skin: Apply sensitive skin moisturizers to dry skin at least two times a day  You may need sunscreen (SPF 30) in the morning and to reapply when outside  o Dry Eyes: Use saline eye drops or artificial tears  o Dry Nose/Nosebleeds: Use saline nasal spray (ex  Ayr) during the day and at bedtime  To stop nosebleeds, hold pressure  If this does not work, call your dermatologist     o Chapped lips: apply petrolatum-based lip balms routinely  Avoid anything “medicated ” Contact your dermatologist for excessive dryness, cracks, tenderness or pain  • Increased blood fats and cholesterol (usually in patients with a personal or family history of cholesterol or triglyceride problems)  • Vision problems affecting your ability to see in the dark and drive at night  • Bone, muscle and tendon aches can occur  Additional stretching before and after activities may help relieve aches  If you are otherwise healthy, consider the use of ibuprofen or naproxen    If you are unsure if you can use these pain medications, ask your doctor first  Also, call your doctor if you experience severe back pain, joint pain, or a broken bone   • Changes in mood, including anxiety, depressive symptoms or suicidal thoughts which may or may not be temporary  Notify your doctor if your or a family member have suffered from these conditions or if you have any concerns during treatment  • Serious birth defects or miscarriage can occur while taking this medication and for one month after taking your last dose of isotretinoin  You must not get pregnant while taking isotretinoin  Once the medication is out of your system, 30 days, there is no effect on the baby  • Increased pressure in the brain  Call your doctor right away if you experience bad headache, blurred vision, dizziness, nausea or vomiting, or seizures  • Skin rash - call your doctor right away if you develop any rashes or blisters on the skin  • Liver damage - call your doctor right away if you experience severe stomach, chest or bowel pain, painful swallowing, diarrhea, blood in your stool, yellowing of your skin or eyes, or dark urine  • Gastrointestinal bleeding  If you experience unusual abdominal pain or red or black/tarry stools, call your doctor immediately  • Worsening acne  Mild worsening of acne can occur in the first few weeks of using isotreinoin  If your acne is getting significantly worse, call your doctor      Scribe Attestation    I,:  Preet Muse am acting as a scribe while in the presence of the attending physician :       I,:  Melly Kauffman MD personally performed the services described in this documentation    as scribed in my presence :         Polo Rivera MD  Dermatology, PGY-2

## 2022-10-14 NOTE — PATIENT INSTRUCTIONS
Acne Vulgaris  High Risk Medication - on Accutane therapy   Xerosis (see below for patient education)      Assessment and Plan:    Acne vulgaris, on isotretinoin   Planned daily dose for next 30 days: 10 mg  - Confirmed patient status and counseling in [facility] today  - Gentle skin care and regular SPF use  Stop all other acne treatments while on isotretinoin   -Recommend daily emollient for lips   - Side effects including but not limited to serious allergic reaction, thrombosis, mood changes, skeletal hyperostosis, premature epiphyseal closure, IBD, bone and muscle pain, HA, hyperlipidemia, photosensitivity, hepatotoxicity, dry skin and eyes, hair loss, teratogenicity (pregnancy category X) risk for up to 1 month after stopping medicine, 20-30% risk of recurrence were reviewed  - The patient understands not to donate blood, drink excessive amounts of alcohol, or share the medication with any individuals   - LFTs and lipid panel next due 11/14/2022  Follow up in 1 month    Discussed continuing to take Prednisone 10 mg daily for another 21 days  Take with food and water with the Accutane 10 mg  Patients counseled:  Cannot donate blood while taking isotretinoin and for 1 month after completing therapy  YES  Do not share medication with anyone  YES  Report any side effects of mood swings or depression and stop medication upon occurrence  YES  Patient needs to confirm counseling in [facility] prior to picking up prescription  YES        Isotretinoin for Acne   Isotretinoin is a retinoid medication that is taken by mouth to treat severe nodular acne  Typically, it is used once other acne treatments have not worked, such as oral antibiotics  Usually isotretinoin is taken for 4 to 6 months, although the length of treatment can vary from person to person  While most patient’s acne improves and may even clear with this medication, in 20% of patients acne can come back   This requires additional acne treatment or even a second cycle of isotretinoin  How should I take isotretinoin? Isotretinoin dosing is weight-based and should be taken exactly as prescribed  If you miss a dose, skip that dose  Do not take 2 doses at the same time  Take with fatty food to help with absorption  You will get a 30 day supply of isotretinoin at a time  It cannot be automatically refilled  Make certain that you have been given enough medication to last 30 days as pharmacists are unable to refill or make changes within a month  You must return to your dermatologist every month to make sure you are not having any serious side effects from isotretinoin  For female patients, this visit will always include a monthly pregnancy test  Other laboratory studies, including liver function tests, cholesterol and triglycerides, must also be conducted before and during treatment  What should I avoid while taking isotretinoin? Do not get pregnant from one month prior or 1 month following taking any isotretinoin  Do not donate blood while take isotretinoin or until 1 months after coming off the medication  Do not have cosmetic procedures to smooth your skin, including waxing, dermabrasion, or laser procedures, while taking this medication and for at least 6 months after you stop  Do not share isotretinoin with any other people  It can cause birth defects and other serious health problems  Do not use any other acne medications, including medicated washes, cleansers, creams or antibiotic pills during your treatment with isotretinoin unless expressly directed to by your dermatologist      Initiating isotretinoin & the [facility] Program   The [facility] is a strict, government-required program to prevent females from becoming pregnant while on isotretinoin  All females and males must participate  Note: Your provider must follow this program and cannot change any of the requirements     If you fail to keep appointments, you will be unable to get your prescription filled  For females of childbearing age: You must be on two specific forms of birth control   You must answer a series of questions either online or by phone every month prior to getting the prescription  Monthly prescriptions must be filled within 7 days of that month's pregnancy test   It is important that you notify your physician well before the 7th day if there are any unforeseen delays, such as prior authorizations  For more information, visit: [email or web address]    What are the possible side effects of isotretinoin? What should I do? Most side effects from isotretinoin are mild and can be easily improved with simple remedies  Others are more concerning  Dry skin and eyes, chapped lips and dry nose that may lead to nosebleeds  Dry Skin: Apply sensitive skin moisturizers to dry skin at least two times a day  You may need sunscreen (SPF 30) in the morning and to reapply when outside  Dry Eyes: Use saline eye drops or artificial tears  Dry Nose/Nosebleeds: Use saline nasal spray (ex  Ayr) during the day and at bedtime  To stop nosebleeds, hold pressure  If this does not work, call your dermatologist     Chapped lips: apply petrolatum-based lip balms routinely  Avoid anything “medicated ” Contact your dermatologist for excessive dryness, cracks, tenderness or pain  Increased blood fats and cholesterol (usually in patients with a personal or family history of cholesterol or triglyceride problems)  Vision problems affecting your ability to see in the dark and drive at night  Bone, muscle and tendon aches can occur  Additional stretching before and after activities may help relieve aches  If you are otherwise healthy, consider the use of ibuprofen or naproxen    If you are unsure if you can use these pain medications, ask your doctor first  Also, call your doctor if you experience severe back pain, joint pain, or a broken bone Changes in mood, including anxiety, depressive symptoms or suicidal thoughts which may or may not be temporary  Notify your doctor if your or a family member have suffered from these conditions or if you have any concerns during treatment  Serious birth defects or miscarriage can occur while taking this medication and for one month after taking your last dose of isotretinoin  You must not get pregnant while taking isotretinoin  Once the medication is out of your system, 30 days, there is no effect on the baby  Increased pressure in the brain  Call your doctor right away if you experience bad headache, blurred vision, dizziness, nausea or vomiting, or seizures  Skin rash - call your doctor right away if you develop any rashes or blisters on the skin  Liver damage - call your doctor right away if you experience severe stomach, chest or bowel pain, painful swallowing, diarrhea, blood in your stool, yellowing of your skin or eyes, or dark urine  Gastrointestinal bleeding  If you experience unusual abdominal pain or red or black/tarry stools, call your doctor immediately  Worsening acne  Mild worsening of acne can occur in the first few weeks of using isotreinoin  If your acne is getting significantly worse, call your doctor

## 2023-08-03 ENCOUNTER — TELEPHONE (OUTPATIENT)
Dept: FAMILY MEDICINE CLINIC | Facility: CLINIC | Age: 33
End: 2023-08-03

## 2023-08-03 NOTE — TELEPHONE ENCOUNTER
first attempt to contact patient. wrong number left message to return my call on answering machine    Let him know to give us a call in the am to make him an appointment.

## 2024-01-01 NOTE — PROGRESS NOTES
[facility] Note     Patient Name: Geoffrey Romero  Encounter Date: 05/24/2022     Have you been cared for by a [facility] Dermatologist in the last 3 years and, if so, which one? No    · Have you traveled outside of the [address] [place] [address] in the past 3 months or outside of the [facility] area in the last 2 weeks? No     May we call your Preferred Phone number to discuss your specific medical information? Yes     May we leave a detailed message that includes your specific medical information? Yes      Today's Chief Concerns:   Concern #1:  Breakouts on face    Concern #2:      Past Medical History:  Have you personally ever had or currently have any of the following? · Skin cancer (such as Melanoma, Basal Cell Carcinoma, Squamous Cell Carcinoma? (If Yes, please provide more detail)- No  · Eczema: No  · Psoriasis: No  · HIV/AIDS: No  · Hepatitis B or C: No  · Tuberculosis: No  · Systemic Immunosuppression such as Diabetes, Biologic or Immunotherapy, Chemotherapy, Organ Transplantation, Bone Marrow Transplantation (If YES, please provide more detail): No  · Radiation Treatment (If YES, please provide more detail): No  · Any other major medical conditions/concerns? (If Yes, which types)- No    Social History:      Family History:  Have any of your "first degree relatives" (parent, brother, sister, or child) had any of the following       · Skin cancer such as Melanoma or Merkel Cell Carcinoma or Pancreatic Cancer? No  · Eczema, Asthma, Hay Fever or Seasonal Allergies: No  · Psoriasis or Psoriatic Arthritis: No  · Do any other medical conditions seem to run in your family? If Yes, what condition and which relatives?   No    Current Medications:   (please update all dermatological medications before printing patient's AVS!)      Current Outpatient Medications:     benzoyl peroxide 5 % external liquid, Apply topically 2 (two) times a day, Disp: 226 g, Rfl: 2    clindamycin (CLINDAGEL) 1 % gel, Apply topically 2 (two) times a day, Disp: 30 g, Rfl: 0    doxycycline (ADOXA) 50 MG tablet, Take 50 mg by mouth in the morning and 50 mg in the evening , Disp: , Rfl:     predniSONE 20 mg tablet, Take 1 tablet (20 mg total) by mouth in the morning , Disp: 30 tablet, Rfl: 0    albuterol (Ventolin HFA) 90 mcg/act inhaler, Inhale 2 puffs every 6 (six) hours as needed for wheezing, Disp: 18 g, Rfl: 5    fluticasone (Flovent HFA) 44 mcg/act inhaler, Inhale 2 puffs 2 (two) times a day Rinse mouth after use  (Patient not taking: Reported on 5/24/2022), Disp: 10 6 g, Rfl: 2    gabapentin (Neurontin) 300 mg capsule, Take 1 capsule (300 mg total) by mouth 3 (three) times a day as needed (pain) (Patient not taking: Reported on 5/24/2022), Disp: 90 capsule, Rfl: 2      Review of Systems:  Have you recently had or currently have any of the following? If YES, what are you doing for the problem? · Fever, chills or unintended weight loss: No  · Sudden loss or change in your vision: No  · Nausea, vomiting or blood in your stool: No  · Painful or swollen joints: No  · Wheezing or cough: No  · Changing mole or non-healing wound: No  · Nosebleeds: No  · Excessive sweating: No  · Easy or prolonged bleeding? No  · Over the last 2 weeks, how often have you been bothered by the following problems? · Taking little interest or pleasure in doing things: 1 - Not at All  · Feeling down, depressed, or hopeless: 1 - Not at All  · Rapid heartbeat with epinephrine:  No      · Any known allergies?       No Known Allergies      Physical Exam:     Was a chaperone (Derm Clinical Assistant) present throughout the entire Physical Exam? Yes        CONSTITUTIONAL:   Vitals:    05/24/22 1104   Temp: 97 7 °F (36 5 °C)   TempSrc: Temporal   Weight: 83 kg (183 lb)   Height: 5' 7" (1 702 m)         PSYCH: Normal mood and affect  EYES: Normal conjunctiva  ENT: Normal lips and oral mucosa  CARDIOVASCULAR: No edema  RESPIRATORY: Normal respirations  HEME/LYMPH/IMMUNO:  No regional lymphadenopathy except as noted below in "ASSESSMENT AND PLAN BY DIAGNOSIS"    SKIN:  FULL ORGAN SYSTEM EXAM   Hair, Scalp, Ears, Face Normal except as noted below in Assessment   Neck Normal except as noted below in Assessment   Back Normal except as noted below in Assessment   Chest/Breasts/Axillae Normal except as noted below in Assessment   Back/Spine Normal except as noted below in Assessment        Assessment and Plan by Diagnosis:    History of Present Condition:     Duration:  How long has this been an issue for you? o  several years    Location Affected:  Where on the body is this affecting you?    o  face, back    Quality:  Is there any bleeding, pain, itch, burning/irritation, or redness associated with the skin lesion? o  itchy and sometimes painful, oozing and bleeding    Severity:  Describe any bleeding, pain, itch, burning/irritation, or redness on a scale of 1 to 10 (with 10 being the worst)  o  10   Timing:  Does this condition seem to be there pretty constantly or do you notice it more at specific times throughout the day? o  constant    Context:  Have you ever noticed that this condition seems to be associated with specific activities you do?    o  denies    Modifying Factors:    o Anything that seems to make the condition worse?    -  water textures, doxycycline, clindamcyin and benzoyl peroxide not helping    o What have you tried to do to make the condition better?    -  denies    Associated Signs and Symptoms:  Does this skin lesion seem to be associated with any of the following:  o  SL AMB DERM SIGNS AND SYMPTOMS: Itching and Scratching         ISOTRETINOIN "ACCUTANE" FOR ACNE CONGLOBATA: MALE    Age:32 y o    Weight: 83 kg      Ipledge number: [id number]   Last 4 digits SS: [id number]      "Goal Dose" in mg (125 mg x weight in kg): 10,375 mg    Interim month   "Daily Dose" of Isotretinoin the patient has actually been taking since last visit (this is usually what was prescribed): 0 mg   "Total # of Days" patient took Isotretinoin since last visit (this is usually 30):  0 days   "Total Monthly Dose" in mg since last visit (this equals "Daily Dose" x "Total # of Days"): 0 mg    Cumulative dosage   "Total cumulative Dose" in mg (add the above "Total Monthly Dose" with "Total Cumulative Dose" from last visit: 0 mg        Symptoms   Conjunctivitis: No   Night Blindness/ Issues with night vision: No   Focusing Problems: No   Dry Lips/Eyes: No   Dry Skin: No   Rash: No   Nose Bleeds: No   Angular cheilitis (cracking in corner of lips): No   Headaches: No   Photosensitivity: No   Dry Anus: No   Depression: No   Mood Changes: No   Suicidal thoughts: No   Fatigue: No   Weight Loss: No   Muscle Pain/Stiffness: No   Abdominal pain: No   Diarrhea: No   Bloody stools: No         Physical Exam   Psychiatric/Mood: normal    Anatomic Location Affected:   Face and back    Morphological Description:  o Open/Closed Comedones:  - Many ("Severe")  o Inflammatory Papules/Pustules:  - Many ("Severe")  o Nodules:  - Many ("Severe")  o Scarring:  - Many ("Severe")  o Excoriations:  - Many ("Severe")  o Local Skin Redness/Erythema:  - Many ("Severe")  o Local Skin Dryness/Scaling:  - Few ("Mild")  o Local Skin Dyspigmentation:  - Many ("Severe")   Pertinent Positives:   Pertinent Negatives:      Labs   Date of last labs: n/a    Completed: No   Labs reviewed: baseline labs ordered      Additional History of Present Condition:  Patient has been taking doxycycline 50 mg and applying clindamcyin and benzoyl peroxide with no improvement       DIAGNOSES:     Acne Vulgaris   High Risk Medication   Medication Monitoring   Xerosis (see below for patient education)    Assessment and Plan:   Target Total Dose per KILOgram:  125 mg/KILOgram (this may change this on a patient-by-patient basis)   Planned daily dose for next 30 days: 10 mg daily     Please remember to add patient's "Ipledge number" to actual prescription):     Return to clinic in about 1 month, please review ipledge guidelines in terms of timing (see below for patient education)   Get labs 1-2 days before next appointment: YES   Patient confirmed in iPledge: YES   Patients counseled:  - Cannot donate blood while taking isotretinoin and for 1 month after completing therapy  YES  - Do not share medication with anyone  YES  - Possible side effects discussed, including sun sensitivity, dry lips/eyes/skin, headaches, blurry vision (pseudotumor cerebri), muscle/joint aches, GI upset, bloody stools (IBD including Crohns/Ulcerative Colitis), jaundice, liver dysfunction, lipid abnormalities, bone marrow dysfunction, mood effects, thoughts of hurting oneself or others, and flaring of acne (acne fulminans/SAPPHO)  YES  - Report any side effects of mood swings or depression and stop medication upon occurrence  YES   Oral medications:        Morning or Night: Start Prednisone 20 mg once a day by mouth for 1 month  Morning and Night: Start Isotretinoin 10 mg once a day by mouth for 1 month     · Follow up in 1 month   · Patient is aware a prior authorization is required for (istotretinoin)       Isotretinoin for Acne   Isotretinoin is a retinoid medication that is taken by mouth to treat severe nodular acne  Typically, it is used once other acne treatments have not worked, such as oral antibiotics  Usually isotretinoin is taken for 4 to 6 months, although the length of treatment can vary from person to person  While most patients acne improves and may even clear with this medication, in 20% of patients acne can come back  This requires additional acne treatment or even a second cycle of isotretinoin  How should I take isotretinoin?  Isotretinoin dosing is weight-based and should be taken exactly as prescribed   If you miss a dose, skip that dose   Do not take 2 doses at the same time   Take with food to help with absorption   All instructions in the iPLEDGE program packet (www [facility]) that was provided must be followed (see below for highlights)   You will get a 30 day supply of isotretinoin at a time  It cannot be automatically refilled  Make certain that you have been given enough medication to last 30 days as pharmacists are unable to refill or make changes within a month   You must return to your dermatologist every month to make sure you are not having any serious side effects from isotretinoin  For female patients, this visit will always include a monthly pregnancy test  Other laboratory studies, including liver function tests, cholesterol and triglycerides, must also be conducted before and during treatment  What should I avoid while taking isotretinoin?  Do not donate blood while take isotretinoin or until 1 months after coming off the medication   Do not have cosmetic procedures to smooth your skin, including waxing, dermabrasion, or laser procedures, while taking this medication and for at least 6 months after you stop   Do not share isotretinoin with any other people  It can cause birth defects and other serious health problems   Do not use any other acne medications, including medicated washes, cleansers, creams or antibiotic pills during your treatment with isotretinoin unless expressly directed to by your dermatologist      Initiating isotretinoin & the iPLEDGE Program    The iPLEDGE Program is a strict, government-required program to prevent females from becoming pregnant while on isotretinoin  All females and males must participate  Note: Your provider must follow this program and cannot change any of the requirements   Before starting isotretinoin, your provider will talk to you about the safe use of this medication and you will need to sign consent forms in order to receive treatment       If you fail to keep appointments, you will be unable to get your prescription filled   For male patients and women of non-childbearing age: There is no waiting period  Once laboratory tests are done, treatment can start   For more information, visit: [email or web address]    What are the possible side effects of isotretinoin? What should I do? Most side effects from isotretinoin are mild and can be easily improved with simple remedies  Others are more concerning   Dry skin and eyes, chapped lips and dry nose that may lead to nosebleeds  o Dry Skin: Apply sensitive skin moisturizers to dry skin at least two times a day  You may need sunscreen (SPF 30) in the morning and to reapply when outside  o Dry Eyes: Use saline eye drops or artificial tears  o Dry Nose/Nosebleeds: Use saline nasal spray (ex  Ayr) during the day and at bedtime  To stop nosebleeds, hold pressure  If this does not work, call your dermatologist     o Chapped lips: apply petrolatum-based lip balms routinely  Avoid anything medicated   Contact your dermatologist for excessive dryness, cracks, tenderness or pain   Increased blood fats and cholesterol (usually in patients with a personal or family history of cholesterol or triglyceride problems)   Vision problems affecting your ability to see in the dark and drive at night   Bone, muscle and tendon aches can occur  Additional stretching before and after activities may help relieve aches  If you are otherwise healthy, consider the use of ibuprofen or naproxen  If you are unsure if you can use these pain medications, ask your doctor first  Also, call your doctor if you experience severe back pain, joint pain, or a broken bone    Changes in mood, including anxiety, depressive symptoms or suicidal thoughts which may or may not be temporary    Notify your doctor if your or a family member have suffered from these conditions or if you have any concerns during treatment   Serious birth defects or miscarriage can occur while taking this medication and for one month after taking your last dose of isotretinoin  You must not get pregnant while taking isotretinoin  Once the medication is out of your system, 30 days, there is no effect on the baby   Increased pressure in the brain  Call your doctor right away if you experience bad headache, blurred vision, dizziness, nausea or vomiting, or seizures   Skin rash - call your doctor right away if you develop any rashes or blisters on the skin   Liver damage - call your doctor right away if you experience severe stomach, chest or bowel pain, painful swallowing, diarrhea, blood in your stool, yellowing of your skin or eyes, or dark urine   Gastrointestinal bleeding  If you experience unusual abdominal pain or red or black/tarry stools, call your doctor immediately  You should also notify your doctor if you or a family member has a history of ulcerative colitis or Crohns disease   Worsening acne  Mild worsening of acne can occur in the first few weeks of using isotreinoin  If your acne is getting significantly worse, call your doctor  This may require temporarily stopping the isotretinoin and possibly adding other medications  XEROSIS ("DRY SKIN")    Dry skin refers to skin that feels dry to touch  Dry skin has a dull surface with a rough, scaly quality  The skin is less pliable and cracked  When dryness is severe, the skin may become inflamed and fissured  Although any body site can be dry, dry skin tends to affect the shins more than any other site  Dry skin is lacking moisture in the outer horny cell layer (stratum corneum) and this results in cracks in the skin surface  Dry skin is also called xerosis, xeroderma or asteatosis (lack of fat)  It can affect males and females of all ages  There is some racial variability in water and lipid content of the skin     Dry skin that starts in early childhood may be one of about 20 types of ichthyosis (fish-scale skin)  There is often a family history of dry skin   Dry skin is commonly seen in people with atopic dermatitis   Nearly everyone > 60 years has dry skin  Dry skin that begins later may be seen in people with certain diseases and conditions   Postmenopausal women   Hypothyroidism   Chronic renal disease    Malnutrition and weight loss    Subclinical dermatitis    Treatment with certain drugs such as oral retinoids, diuretics and epidermal growth factor receptor inhibitors    People exposed to a dry environment may experience dry skin   Low humidity: in desert climates or cool, windy conditions    Excessive air conditioning    Direct heat from a fire or fan heater    Excessive bathing    Contact with soap, detergents and solvents    Inappropriate topical agents such as alcohol    Frictional irritation from rough clothing or abrasives    Dry skin is due to abnormalities in the integrity of the barrier function of the stratum corneum, which is made up of corneocytes   There is an overall reduction in the lipids in the stratum corneum   Ratio of ceramides, cholesterol and free fatty acids may be normal or altered   There may be a reduction in the proliferation of keratinocytes   Keratinocyte subtypes change in dry skin with a decrease in keratins K1, K10 and increase in K5, K14     Involucrin (a protein) may be expressed early, increasing cell stiffness   The result is retention of corneocytes and reduced water-holding capacity  What is the treatment for dry skin? The mainstay of treatment of dry skin and ichthyosis is moisturisers/emollients  They should be applied liberally and often enough to:   Reduce itch    Improve the barrier function    Prevent entry of irritants, bacteria    Reduce transepidermal water loss      When considering which emollient is most suitable, consider:   Severity of the dryness  Tolerance    Personal preference    Cost and availability  Emollients generally work best if applied to damp skin, if pH is below 7 (acidic), and if containing humectants such as urea or propylene glycol  Additional treatments include:   Topical steroid if itchy or there is dermatitis -- choose an emollient base    Topical calcineurin inhibitors if topical steroids are unsuitable  How can dry skin be prevented? Eliminate aggravating factors:   Reduce the frequency of bathing   A humidifier in winter and air conditioner in summer    Compare having a short shower with a prolonged soak in a bath   Use lukewarm, not hot, water   Replace standard soap with a substitute such as a synthetic detergent cleanser, water-miscible emollient, bath oil, anti-pruritic tar oil, colloidal oatmeal etc     Apply an emollient liberally and often, particularly shortly after bathing, and when itchy  The drier the skin, the thicker this should be, especially on the hands  What is the outlook for dry skin? A tendency to dry skin may persist life-long, or it may improve once contributing factors are controlled      Scribe Attestation    I,:  Christiano Nunez am acting as a scribe while in the presence of the attending physician :       I,:  Erin Brand MD personally performed the services described in this documentation    as scribed in my presence : Statement Selected

## 2024-03-06 ENCOUNTER — TELEPHONE (OUTPATIENT)
Dept: FAMILY MEDICINE CLINIC | Facility: CLINIC | Age: 34
End: 2024-03-06

## 2024-03-06 NOTE — TELEPHONE ENCOUNTER
Hello, this is my Momma's Tonio. I have an appointment for 4:00 today, which I'm not gonna be able to make it. I can't get out of work. I will have off on Monday. If it's possible, can you guys call me back to reschedule at [phone]? Again, my birthday is [birth year] and my name is Pradip Elizalde. Thank you very much. ranjit Potter.      Appointment rescheduled.
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
## 2024-03-11 ENCOUNTER — OFFICE VISIT (OUTPATIENT)
Dept: FAMILY MEDICINE CLINIC | Facility: CLINIC | Age: 34
End: 2024-03-11

## 2024-03-11 VITALS
SYSTOLIC BLOOD PRESSURE: 110 MMHG | WEIGHT: 161 LBS | TEMPERATURE: 98.7 F | OXYGEN SATURATION: 99 % | HEIGHT: 67 IN | DIASTOLIC BLOOD PRESSURE: 70 MMHG | RESPIRATION RATE: 16 BRPM | BODY MASS INDEX: 25.27 KG/M2 | HEART RATE: 85 BPM

## 2024-03-11 DIAGNOSIS — G89.29 CHRONIC LOW BACK PAIN, UNSPECIFIED BACK PAIN LATERALITY, UNSPECIFIED WHETHER SCIATICA PRESENT: Primary | ICD-10-CM

## 2024-03-11 DIAGNOSIS — M54.50 CHRONIC LOW BACK PAIN, UNSPECIFIED BACK PAIN LATERALITY, UNSPECIFIED WHETHER SCIATICA PRESENT: Primary | ICD-10-CM

## 2024-03-11 DIAGNOSIS — T58.01XD: ICD-10-CM

## 2024-03-11 DIAGNOSIS — Z09 HOSPITAL DISCHARGE FOLLOW-UP: ICD-10-CM

## 2024-03-11 PROBLEM — T58.01XA CARBON MONOXIDE POISONING FROM MOTOR VEHICLE EXHAUST: Status: ACTIVE | Noted: 2024-03-11

## 2024-03-11 PROCEDURE — 99213 OFFICE O/P EST LOW 20 MIN: CPT | Performed by: FAMILY MEDICINE

## 2024-03-11 RX ORDER — IBUPROFEN 600 MG/1
600 TABLET ORAL EVERY 8 HOURS PRN
Qty: 30 TABLET | Refills: 0 | Status: SHIPPED | OUTPATIENT
Start: 2024-03-11

## 2024-03-11 RX ORDER — METHOCARBAMOL 500 MG/1
500 TABLET, FILM COATED ORAL
Qty: 14 TABLET | Refills: 0 | Status: SHIPPED | OUTPATIENT
Start: 2024-03-11 | End: 2024-03-25

## 2024-03-11 RX ORDER — ACETAMINOPHEN 500 MG
1000 TABLET ORAL EVERY 8 HOURS PRN
Qty: 30 TABLET | Refills: 1 | Status: SHIPPED | OUTPATIENT
Start: 2024-03-11

## 2024-03-11 NOTE — ASSESSMENT & PLAN NOTE
Occurred 2/28, transported to and received hyperbarics treatment In [place], [place]   Reports some episodes of SOB, but not often and likely asthma exacerbation

## 2024-03-11 NOTE — PATIENT INSTRUCTIONS
Lower Back Exercises   AMBULATORY CARE:   Lower back exercises  help heal and strengthen your back muscles to prevent another injury. Ask your healthcare provider if you need to see a physical therapist for more advanced exercises.  Seek care immediately if:   You have severe pain that prevents you from moving.       Call your doctor if:   Your pain becomes worse.    You have new pain.    You have questions or concerns about your condition or care.    Do lower back exercises safely:   Do the exercises on a mat or firm surface (not on a bed).  A firm surface will support your spine and prevent low back pain.    Move slowly and smoothly.  Avoid fast or jerky motions.    Breathe normally.  Do not hold your breath.    Stop if you feel pain.  It is normal to feel some discomfort at first, but you should not feel pain. Regular exercise will help decrease your discomfort over time.    Lower back exercises:  Your healthcare provider may recommend that you do back exercises 10 to 30 minutes each day. He or she may also recommend that you do exercises 1 to 3 times each day. Ask your provider which exercises are best for you and how often to do them.  Ankle pumps:  Lie on your back. Move your foot up (with your toes pointing toward your head). Then, move your foot down (with your toes pointing away from you). Repeat this exercise 10 times on each side.         Heel slides:  Lie on your back. Slowly bend one leg and then straighten it. Next, bend the other leg and then straighten it. Repeat 10 times on each side.         Pelvic tilt:  Lie on your back with your knees bent and feet flat on the floor. Place your arms in a relaxed position beside your body. Tighten the muscles of your abdomen and flatten your back against the floor. Hold for 5 seconds. Repeat 5 times.         Back stretch:  Lie on your back with your hands behind your head. Bend your knees and turn the lower half of your body to one side. Hold this position for 10  seconds. Repeat 3 times on each side.         Straight leg raises:  Lie on your back with one leg straight. Bend the other knee. Tighten your abdomen and then slowly lift the straight leg up about 6 to 12 inches off the floor. Hold for 1 to 5 seconds. Lower your leg slowly. Repeat 10 times on each leg.         Knee-to-chest:  Lie on your back with your knees bent and feet flat on the floor. Pull one of your knees toward your chest and hold it there for 5 seconds. Return your leg to the starting position. Lift the other knee toward your chest and hold for 5 seconds. Do this 5 times on each side.         Cat and camel:  Place your hands and knees on the floor. Arch your back upward toward the ceiling and lower your head. Round out your spine as much as you can. Hold for 5 seconds. Lift your head upward and push your chest downward toward the floor. Hold for 5 seconds. Do 3 sets or as directed.         Wall squats:  Stand with your back against a wall. Tighten the muscles of your abdomen. Slowly lower your body until your knees are bent at a 45 degree angle. Hold this position for 5 seconds. Slowly move back up to a standing position. Repeat 10 times.         Curl up:  Lie on your back with your knees bent and feet flat on the floor. Place your hands, palms down, underneath the curve in your lower back. Next, with your elbows on the floor, lift your shoulders and chest 2 to 3 inches. Keep your head in line with your shoulders. Hold this position for 5 seconds. When you can do this exercise without pain for 10 to 15 seconds, you may add a rotation. While your shoulders and chest are lifted off the ground, turn slightly to the left and hold. Repeat on the other side.         Bird dog:  Place your hands and knees on the floor. Keep your wrists directly below your shoulders and your knees directly below your hips. Pull your belly button in toward your spine. Do not flatten or arch your back. Tighten your abdominal muscles.  Raise one arm straight out so that it is aligned with your head. Next, raise the leg opposite your arm. Hold this position for 15 seconds. Lower your arm and leg slowly and change sides. Do 5 sets.       Follow up with your doctor as directed:  Write down your questions so you remember to ask them during your visits.  © Copyright [employer] 2023 Information is for End User's use only and may not be sold, redistributed or otherwise used for commercial purposes.  The above information is an  only. It is not intended as medical advice for individual conditions or treatments. Talk to your doctor, nurse or pharmacist before following any medical regimen to see if it is safe and effective for you.      Core Strengthening Exercises   AMBULATORY CARE:   What you need to know about core strengthening exercises:  Your core includes the muscles of your lower back, hip, pelvis, and abdomen. Core strengthening exercises help heal and strengthen these muscles. This helps prevent another injury, and keeps your pelvis, spine, and hips in the correct position.  Call your doctor or physical therapist if:   You have sharp or worsening pain during exercise or at rest.    You have questions or concerns about your shoulder exercises.    Safety tips:  Talk to your healthcare provider before you start an exercise program. A physical therapist can teach you how to do core strengthening exercises safely.  Do the exercises on a mat or firm surface.  A firm surface will support your spine and prevent low back pain. Do not do these exercises on a bed.    Move slowly and smoothly.  Avoid fast or jerky motions.    Stop if you feel pain.  You may feel some discomfort at first, but you should not feel pain. Tell your provider or physical therapist if you have pain while you exercise. Regular exercise will help decrease your discomfort over time.    Breathe normally during core exercises.  Do not hold your breath. This may cause an  increase in blood pressure and prevent muscle strengthening. Your healthcare provider will tell you when to inhale and exhale during the exercise.    Begin all of your exercises with abdominal bracing.  Abdominal bracing helps warm up your core muscles. You can also practice abdominal bracing throughout the day. Lie on your back with your knees bent and feet flat on the floor. Place your arms in a relaxed position beside your body. Tighten your abdominal muscles. Pull your belly button in and up toward your spine. Hold for 5 seconds. Relax your muscles. Repeat 10 times.       Core strengthening exercises:  Your healthcare provider will tell you how often to do these exercises. The provider will also tell you how many repetitions of each exercise you should do. Hold each exercise for 5 seconds or as directed. As you get stronger, increase your hold to 10 to 15 seconds. You can do some of these exercises on a stability ball, or with a weight. Ask your healthcare provider how to use a stability ball or weight for these exercises:  Bridging:  Lie on your back with your knees bent and feet flat on the floor. Rest your arms at your side. Tighten your buttocks, and then lift your hips 1 inch off the floor. Hold for 5 seconds. When you can do this exercise without pain for 10 seconds, increase the distance you lift your hips. A good goal is to be able to lift your hips so that your shoulders, hips, and knees are in a straight line.         Dead bug:  Lie on your back with your knees bent and feet flat on the floor. Place your arms in a relaxed position beside your body. Begin with abdominal bracing. Next, raise one leg, keeping your knee bent. Hold for 5 seconds. Repeat with the other leg. When you can do this exercise without pain for 10 to 15 seconds, you may raise one straight leg and hold. Repeat with the other leg.         Quadruped:  Place your hands and knees on the floor. Keep your wrists directly below your  shoulders and your knees directly below your hips. Pull your belly button in toward your spine. Do not flatten or arch your back. Tighten your abdominal muscles below your belly button. Hold for 5 seconds. When you can do this exercise without pain for 10 to 15 seconds, you may extend one arm and hold. Repeat on the other side.         Side bridge exercises:      Standing side bridge:  Stand next to a wall and extend one arm toward the wall. Place your palm flat on the wall with your fingers pointing upward. Begin with abdominal bracing. Next, without moving your feet, slowly bend your arm to 90 degrees. Hold for 5 seconds. Repeat on the other side. When you can do this exercise without pain for 10 to 15 seconds, you may do the bent leg side bridge on the floor.         Bent leg side bridge:  Lie on one side with your legs, hips, and shoulders in a straight line. Prop yourself up onto your forearm so your elbow is directly below your shoulder. Bend your knees back to 90 degrees. Begin with abdominal bracing. Next, lift your hips and balance yourself on your forearm and knees. Hold for 5 seconds. Repeat on the other side. When you can do this exercise without pain for 10 to 15 seconds, you may do the straight leg side bridge on the floor.         Straight leg side bridge:  Lie on one side with your legs, hips, and shoulders in a straight line. Prop yourself up onto your forearm so your elbow is directly below your shoulder. Begin with abdominal bracing. Lift your hips off the floor and balance yourself on your forearm and the outside of your flexed foot. Do not let your ankle bend sideways. Hold for 5 seconds. Repeat on the other side. When you can do this exercise without pain for 10 to 15 seconds, ask your healthcare provider for more advanced exercises.       Superman:  Lie on your stomach. Extend your arms forward on the floor. Tighten your abdominal muscles and lift your right hand and left leg off the floor.  Hold this position. Slowly return to the starting position. Tighten your abdominal muscles and lift your left hand and right leg off the floor. Hold this position. Slowly return to the starting position.         Clam:  Lie on your side with your knees bent. Put your bottom arm under your head to keep your neck in line. Put your top hand on your hip to keep your pelvis from moving. Put your heels together, and keep them together during this exercise. Slowly raise your top knee toward the ceiling. Then lower your leg so your knees are together. Repeat this exercise 10 times. Then switch sides and do the exercise 10 times with the other leg.         Curl up:  Lie on your back with your knees bent and feet flat on the floor. Place your hands, palms down, underneath your lower back. Next, with your elbows on the floor, lift your shoulders and chest 2 to 3 inches off the floor. Keep your head in line with your shoulders. Hold this position. Slowly return to the starting position.         Straight leg raises:  Lie on your back with one leg straight. Bend the other knee and place your foot flat on the floor. Tighten your abdominal muscles. Keep your leg straight and slowly lift it straight up 6 to 12 inches off the floor. Hold this position. Lower your leg slowly. Do as many repetitions as directed on this side. Repeat with the other leg.         Plank:  Lie on your stomach. Bend your elbows and place your forearms flat on the floor. Lift your chest, stomach, and knees off the floor. Make sure your elbows are below your shoulders. Your body should be in a straight line. Do not let your hips or lower back sink to the ground. Squeeze your abdominal muscles together and hold for 15 seconds. To make this exercise harder, hold for 30 seconds or lift 1 leg at a time.         Bicycles:  Lie on your back. Bend both knees and bring them toward your chest. Your calves should be parallel to the floor. Place the palms of your hands on  the back of your head. Straighten your right leg and keep it lifted 2 inches off the floor. Raise your head and shoulders off the floor and twist towards your left. Keep your head and shoulders lifted. Bend your right knee while you straighten your left leg. Keep your left leg 2 inches off the floor. Twist your head and chest towards the left leg. Continue to straighten 1 leg at a time and twist.       Follow up with your doctor or physical therapist as directed:  Write down your questions so you remember to ask them during your visits.  © Copyright [employer] 2023 Information is for End User's use only and may not be sold, redistributed or otherwise used for commercial purposes.  The above information is an  only. It is not intended as medical advice for individual conditions or treatments. Talk to your doctor, nurse or pharmacist before following any medical regimen to see if it is safe and effective for you.      Neck Exercises   AMBULATORY CARE:   Neck exercises  help reduce neck pain, and improve neck movement and strength. Neck exercises also help prevent long-term neck problems.  Call your doctor if:   Your pain does not get better, or gets worse.    You have questions or concerns about your condition, care, or exercise program.    What you need to know about exercise safety:   Move slowly, gently, and smoothly.  Avoid fast or jerky motions.    Stand and sit the way your healthcare provider shows you.  Good posture may reduce your neck pain. Check your posture often, even when you are not doing your neck exercises.    Follow the exercise program recommended by your healthcare provider.  He or she will tell you which exercises are best for your condition. He or she will also tell you how many repetitions to do and how often you should do the exercises.    How to perform neck exercises safely:   Exercise position:  You may sit or stand while you do neck exercises. Face forward. Your shoulders  should be straight and relaxed, with a good posture.         Head tilts, forward and back:  Gently bow your head and try to touch your chin to your chest. Your healthcare provider may tell you to push on the back of your neck to help bow your head. Raise your chin back to the starting position. Tilt your head back as far as possible so you are looking up at the ceiling. Your healthcare provider may tell you to lift your chin to help tilt your head back. Return your head to the starting position.         Head tilts, side to side:  Tilt your head, bringing your ear toward your shoulder. Then tilt your head toward the other shoulder.         Head turns:  Turn your head to look over your shoulder. Tilt your chin down and try to touch it to your shoulder. Do not raise your shoulder to your chin. Face forward again. Do the same on the other side.         Head rolls:  Slowly bring your chin toward your chest. Next, roll your head to the right. Your ear should be positioned over your shoulder. Hold this position for 5 seconds. Roll your head back toward your chest and to the left into the same position. Hold for 5 seconds. Gently roll your head back and around in a clockwise [place] 3 times. Next, move your head in the reverse direction (counterclockwise) in a [place] 3 times. Do not shrug your shoulders upwards while you do this exercise.       Follow up with your doctor as directed:  Write down your questions so you remember to ask them during your visits.  © Copyright [employer] 2023 Information is for End User's use only and may not be sold, redistributed or otherwise used for commercial purposes.  The above information is an  only. It is not intended as medical advice for individual conditions or treatments. Talk to your doctor, nurse or pharmacist before following any medical regimen to see if it is safe and effective for you.
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
## 2024-03-22 ENCOUNTER — TELEPHONE (OUTPATIENT)
Dept: FAMILY MEDICINE CLINIC | Facility: CLINIC | Age: 34
End: 2024-03-22

## 2024-04-16 ENCOUNTER — HOSPITAL ENCOUNTER (EMERGENCY)
Facility: HOSPITAL | Age: 34
Discharge: HOME | End: 2024-04-16
Attending: EMERGENCY MEDICINE
Payer: COMMERCIAL

## 2024-04-16 ENCOUNTER — APPOINTMENT (EMERGENCY)
Dept: CT IMAGING | Facility: HOSPITAL | Age: 34
End: 2024-04-16
Payer: COMMERCIAL

## 2024-04-16 VITALS
TEMPERATURE: 97.6 F | SYSTOLIC BLOOD PRESSURE: 117 MMHG | OXYGEN SATURATION: 97 % | BODY MASS INDEX: 26.69 KG/M2 | RESPIRATION RATE: 18 BRPM | DIASTOLIC BLOOD PRESSURE: 59 MMHG | WEIGHT: 170.42 LBS | HEART RATE: 57 BPM

## 2024-04-16 DIAGNOSIS — R42 LIGHTHEADEDNESS: ICD-10-CM

## 2024-04-16 DIAGNOSIS — R10.9 ACUTE ABDOMINAL PAIN: Primary | ICD-10-CM

## 2024-04-16 DIAGNOSIS — N20.0 KIDNEY STONE: ICD-10-CM

## 2024-04-16 DIAGNOSIS — R31.9 HEMATURIA: ICD-10-CM

## 2024-04-16 DIAGNOSIS — R00.1 SINUS BRADYCARDIA: ICD-10-CM

## 2024-04-16 LAB
ALBUMIN SERPL BCP-MCNC: 4.2 G/DL (ref 3.5–5)
ALP SERPL-CCNC: 78 U/L (ref 34–104)
ALT SERPL W P-5'-P-CCNC: 6 U/L (ref 7–52)
ANION GAP SERPL CALCULATED.3IONS-SCNC: 6 MMOL/L (ref 4–13)
AST SERPL W P-5'-P-CCNC: 14 U/L (ref 13–39)
BACTERIA UR QL AUTO: ABNORMAL /HPF
BASOPHILS # BLD AUTO: 0.04 THOUSANDS/ÂΜL (ref 0–0.1)
BASOPHILS NFR BLD AUTO: 0 % (ref 0–1)
BILIRUB SERPL-MCNC: 0.27 MG/DL (ref 0.2–1)
BILIRUB UR QL STRIP: NEGATIVE
BUN SERPL-MCNC: 11 MG/DL (ref 5–25)
CALCIUM SERPL-MCNC: 8.9 MG/DL (ref 8.4–10.2)
CHLORIDE SERPL-SCNC: 106 MMOL/L (ref 96–108)
CLARITY UR: CLEAR
CO2 SERPL-SCNC: 27 MMOL/L (ref 21–32)
COLOR UR: YELLOW
CREAT SERPL-MCNC: 0.76 MG/DL (ref 0.6–1.3)
EOSINOPHIL # BLD AUTO: 0.4 THOUSAND/ÂΜL (ref 0–0.61)
EOSINOPHIL NFR BLD AUTO: 4 % (ref 0–6)
ERYTHROCYTE [DISTWIDTH] IN BLOOD BY AUTOMATED COUNT: 13.4 % (ref 11.6–15.1)
GFR SERPL CREATININE-BSD FRML MDRD: 120 ML/MIN/1.73SQ M
GLUCOSE SERPL-MCNC: 109 MG/DL (ref 65–140)
GLUCOSE UR STRIP-MCNC: NEGATIVE MG/DL
HCT VFR BLD AUTO: 40.4 % (ref 36.5–49.3)
HGB BLD-MCNC: 13.7 G/DL (ref 12–17)
HGB UR QL STRIP.AUTO: ABNORMAL
IMM GRANULOCYTES # BLD AUTO: 0.02 THOUSAND/UL (ref 0–0.2)
IMM GRANULOCYTES NFR BLD AUTO: 0 % (ref 0–2)
KETONES UR STRIP-MCNC: NEGATIVE MG/DL
LEUKOCYTE ESTERASE UR QL STRIP: NEGATIVE
LIPASE SERPL-CCNC: 12 U/L (ref 11–82)
LYMPHOCYTES # BLD AUTO: 2.26 THOUSANDS/ÂΜL (ref 0.6–4.47)
LYMPHOCYTES NFR BLD AUTO: 22 % (ref 14–44)
MAGNESIUM SERPL-MCNC: 1.9 MG/DL (ref 1.9–2.7)
MCH RBC QN AUTO: 31.5 PG (ref 26.8–34.3)
MCHC RBC AUTO-ENTMCNC: 33.9 G/DL (ref 31.4–37.4)
MCV RBC AUTO: 93 FL (ref 82–98)
MONOCYTES # BLD AUTO: 0.63 THOUSAND/ÂΜL (ref 0.17–1.22)
MONOCYTES NFR BLD AUTO: 6 % (ref 4–12)
MUCOUS THREADS UR QL AUTO: ABNORMAL
NEUTROPHILS # BLD AUTO: 6.77 THOUSANDS/ÂΜL (ref 1.85–7.62)
NEUTS SEG NFR BLD AUTO: 68 % (ref 43–75)
NITRITE UR QL STRIP: NEGATIVE
NON-SQ EPI CELLS URNS QL MICRO: ABNORMAL /HPF
NRBC BLD AUTO-RTO: 0 /100 WBCS
PH UR STRIP.AUTO: 7 [PH] (ref 4.5–8)
PHOSPHATE SERPL-MCNC: 3 MG/DL (ref 2.7–4.5)
PLATELET # BLD AUTO: 218 THOUSANDS/UL (ref 149–390)
PMV BLD AUTO: 9.2 FL (ref 8.9–12.7)
POTASSIUM SERPL-SCNC: 3.6 MMOL/L (ref 3.5–5.3)
PROT SERPL-MCNC: 7.6 G/DL (ref 6.4–8.4)
PROT UR STRIP-MCNC: NEGATIVE MG/DL
RBC # BLD AUTO: 4.35 MILLION/UL (ref 3.88–5.62)
RBC #/AREA URNS AUTO: ABNORMAL /HPF
SODIUM SERPL-SCNC: 139 MMOL/L (ref 135–147)
SP GR UR STRIP.AUTO: 1.01 (ref 1–1.03)
TSH SERPL DL<=0.05 MIU/L-ACNC: 1.57 UIU/ML (ref 0.45–4.5)
UROBILINOGEN UR QL STRIP.AUTO: 0.2 E.U./DL
WBC # BLD AUTO: 10.12 THOUSAND/UL (ref 4.31–10.16)
WBC #/AREA URNS AUTO: ABNORMAL /HPF

## 2024-04-16 PROCEDURE — 85025 COMPLETE CBC W/AUTO DIFF WBC: CPT | Performed by: EMERGENCY MEDICINE

## 2024-04-16 PROCEDURE — 83735 ASSAY OF MAGNESIUM: CPT | Performed by: EMERGENCY MEDICINE

## 2024-04-16 PROCEDURE — 96365 THER/PROPH/DIAG IV INF INIT: CPT

## 2024-04-16 PROCEDURE — 83690 ASSAY OF LIPASE: CPT | Performed by: EMERGENCY MEDICINE

## 2024-04-16 PROCEDURE — 74177 CT ABD & PELVIS W/CONTRAST: CPT

## 2024-04-16 PROCEDURE — 84100 ASSAY OF PHOSPHORUS: CPT | Performed by: EMERGENCY MEDICINE

## 2024-04-16 PROCEDURE — 36415 COLL VENOUS BLD VENIPUNCTURE: CPT | Performed by: EMERGENCY MEDICINE

## 2024-04-16 PROCEDURE — 84443 ASSAY THYROID STIM HORMONE: CPT | Performed by: EMERGENCY MEDICINE

## 2024-04-16 PROCEDURE — 96375 TX/PRO/DX INJ NEW DRUG ADDON: CPT

## 2024-04-16 PROCEDURE — 99285 EMERGENCY DEPT VISIT HI MDM: CPT | Performed by: EMERGENCY MEDICINE

## 2024-04-16 PROCEDURE — 80053 COMPREHEN METABOLIC PANEL: CPT | Performed by: EMERGENCY MEDICINE

## 2024-04-16 PROCEDURE — 81001 URINALYSIS AUTO W/SCOPE: CPT

## 2024-04-16 PROCEDURE — 99284 EMERGENCY DEPT VISIT MOD MDM: CPT

## 2024-04-16 RX ORDER — FENTANYL CITRATE 50 UG/ML
1 INJECTION, SOLUTION INTRAMUSCULAR; INTRAVENOUS ONCE
Status: COMPLETED | OUTPATIENT
Start: 2024-04-16 | End: 2024-04-16

## 2024-04-16 RX ORDER — KETOROLAC TROMETHAMINE 30 MG/ML
15 INJECTION, SOLUTION INTRAMUSCULAR; INTRAVENOUS ONCE
Status: COMPLETED | OUTPATIENT
Start: 2024-04-16 | End: 2024-04-16

## 2024-04-16 RX ORDER — ONDANSETRON 4 MG/1
4 TABLET, FILM COATED ORAL EVERY 8 HOURS PRN
Qty: 7 TABLET | Refills: 0 | Status: SHIPPED | OUTPATIENT
Start: 2024-04-16

## 2024-04-16 RX ORDER — ONDANSETRON 2 MG/ML
1 INJECTION INTRAMUSCULAR; INTRAVENOUS ONCE
Status: COMPLETED | OUTPATIENT
Start: 2024-04-16 | End: 2024-04-16

## 2024-04-16 RX ORDER — KETOROLAC TROMETHAMINE 10 MG/1
10 TABLET, FILM COATED ORAL EVERY 6 HOURS PRN
Qty: 15 TABLET | Refills: 0 | Status: SHIPPED | OUTPATIENT
Start: 2024-04-16

## 2024-04-16 RX ADMIN — IOHEXOL 100 ML: 350 INJECTION, SOLUTION INTRAVENOUS at 16:18

## 2024-04-16 RX ADMIN — KETOROLAC TROMETHAMINE 15 MG: 30 INJECTION, SOLUTION INTRAMUSCULAR; INTRAVENOUS at 15:20

## 2024-04-16 RX ADMIN — SODIUM CHLORIDE, SODIUM LACTATE, POTASSIUM CHLORIDE, AND CALCIUM CHLORIDE 1000 ML: .6; .31; .03; .02 INJECTION, SOLUTION INTRAVENOUS at 15:08

## 2024-04-16 NOTE — Clinical Note
Pradip Elizalde was seen and treated in our emergency department on 4/16/2024.    No restrictions            Diagnosis:     Pradip  may return to work on return date.    He may return on this date: 04/18/2024         If you have any questions or concerns, please don't hesitate to call.      Constantin Muhammad MD    ______________________________           _______________          _______________  Hospital Representative                              Date                                Time

## 2024-04-16 NOTE — ED PROVIDER NOTES
History  Chief Complaint   Patient presents with    Abdominal Pain     Ptc/o right sided abd. Pain that started this morning. Pt also c/o dizziness with nausea. Pt had x1 episode of vomiting. Pt c/o cp and sob.     34-year-old male presents for evaluation of diffuse severe abdominal pain which started suddenly prior to arrival.  It is constant, unchanged without modifying factors for his abdomen feeling nauseous, lightheaded like he is about to faint.  No history of similar symptoms.  No fevers chills cough, diarrhea, constipation.      History provided by:  Patient  Abdominal Pain  Associated symptoms: nausea    Associated symptoms: no chest pain, no constipation, no diarrhea, no dysuria, no fatigue, no fever, no hematuria, no shortness of breath, no sore throat and no vomiting        Prior to Admission Medications   Prescriptions Last Dose Informant Patient Reported? Taking?   Myorisan 10 MG capsule   No No   Sig: Take 1 capsule (10 mg total) by mouth daily   acetaminophen (TYLENOL) 500 mg tablet   No No   Sig: Take 2 tablets (1,000 mg total) by mouth every 8 (eight) hours as needed for mild pain or moderate pain   albuterol (Ventolin HFA) 90 mcg/act inhaler   No No   Sig: Inhale 2 puffs every 6 (six) hours as needed for wheezing   buPROPion (WELLBUTRIN) 75 mg tablet   Yes No   busPIRone (BUSPAR) 10 mg tablet   Yes No   fluticasone (Flovent HFA) 44 mcg/act inhaler   No No   Sig: Inhale 2 puffs 2 (two) times a day Rinse mouth after use.   gabapentin (Neurontin) 300 mg capsule   No No   Sig: Take 1 capsule (300 mg total) by mouth 3 (three) times a day as needed (pain)   Patient not taking: Reported on 5/24/2022   ibuprofen (MOTRIN) 600 mg tablet   No No   Sig: Take 1 tablet (600 mg total) by mouth every 8 (eight) hours as needed for mild pain or moderate pain   methocarbamol (ROBAXIN) 500 mg tablet   No No   Sig: Take 1 tablet (500 mg total) by mouth daily at bedtime for 14 days   prazosin (MINIPRESS) 2 mg capsule    Yes No      Facility-Administered Medications: None       Past Medical History:   Diagnosis Date    Acne     Bipolar disorder (HCC)     Depression     Melanoma (HCC)     N/a    Skin cancer        No past surgical history on file.    Family History   Problem Relation Age of Onset    No Known Problems Mother     No Known Problems Father     Asthma Family     Anxiety disorder Family      I have reviewed and agree with the history as documented.    E-Cigarette/Vaping    E-Cigarette Use Never User      E-Cigarette/Vaping Substances     Social History     Tobacco Use    Smoking status: Heavy Smoker     Current packs/day: 1.00     Types: Cigarettes     Passive exposure: Current    Smokeless tobacco: Never    Tobacco comments:     Passive smoke exposure   Vaping Use    Vaping status: Never Used   Substance Use Topics    Alcohol use: Not Currently    Drug use: Yes     Types: Marijuana       Review of Systems   Constitutional:  Negative for activity change, appetite change, fatigue and fever.   HENT:  Negative for congestion, dental problem, ear pain, rhinorrhea and sore throat.    Eyes:  Negative for pain and redness.   Respiratory:  Negative for chest tightness, shortness of breath and wheezing.    Cardiovascular:  Negative for chest pain and palpitations.   Gastrointestinal:  Positive for abdominal pain and nausea. Negative for blood in stool, constipation, diarrhea and vomiting.   Endocrine: Negative for cold intolerance and heat intolerance.   Genitourinary:  Negative for dysuria, frequency and hematuria.   Musculoskeletal:  Negative for arthralgias and myalgias.   Skin:  Negative for color change, pallor and rash.   Neurological:  Positive for light-headedness. Negative for dizziness, weakness and numbness.   Hematological:  Does not bruise/bleed easily.   Psychiatric/Behavioral:  Negative for agitation, hallucinations and suicidal ideas.        Physical Exam  Physical Exam  Vitals and nursing note reviewed.    Constitutional:       Appearance: He is well-developed.   HENT:      Mouth/Throat:      Pharynx: No oropharyngeal exudate.   Eyes:      Conjunctiva/sclera: Conjunctivae normal.   Neck:      Comments: No meningeal signs  Cardiovascular:      Rate and Rhythm: Normal rate and regular rhythm.      Heart sounds: Normal heart sounds.   Pulmonary:      Effort: Pulmonary effort is normal. No respiratory distress.      Breath sounds: Normal breath sounds. No wheezing or rales.   Chest:      Chest wall: No tenderness.   Abdominal:      General: Bowel sounds are normal. There is no distension.      Palpations: Abdomen is soft. There is no mass.      Tenderness: There is generalized abdominal tenderness. There is guarding. There is no rebound.      Hernia: No hernia is present.      Comments: No cvat   Musculoskeletal:         General: Normal range of motion.      Cervical back: Normal range of motion and neck supple.   Lymphadenopathy:      Cervical: No cervical adenopathy.   Skin:     Findings: No erythema or rash.      Comments: No edema   Neurological:      Mental Status: He is alert and oriented to person, place, and time.      Cranial Nerves: No cranial nerve deficit.   Psychiatric:         Behavior: Behavior normal.         Vital Signs  ED Triage Vitals   Temperature Pulse Respirations Blood Pressure SpO2   04/16/24 1523 04/16/24 1443 04/16/24 1443 04/16/24 1443 04/16/24 1443   97.6 °F (36.4 °C) (!) 39 17 155/84 100 %      Temp Source Heart Rate Source Patient Position - Orthostatic VS BP Location FiO2 (%)   04/16/24 1523 04/16/24 1443 04/16/24 1443 04/16/24 1443 --   Oral Monitor Lying Right arm       Pain Score       04/16/24 1443       8           Vitals:    04/16/24 1500 04/16/24 1626 04/16/24 1630 04/16/24 1700   BP: 133/65 114/52 114/52 101/52   Pulse: (!) 41 60 59 56   Patient Position - Orthostatic VS:             Visual Acuity      ED Medications  Medications   fentanyl citrate (PF) (FOR EMS ONLY) 100 mcg/2 mL  injection 100 mcg (0 mcg Does not apply Given to EMS 4/16/24 1444)   ondansetron (FOR EMS ONLY) (ZOFRAN) 4 mg/2 mL injection 4 mg (0 mg Does not apply Given to EMS 4/16/24 1444)   lactated ringers bolus 1,000 mL (0 mL Intravenous Stopped 4/16/24 1608)   ketorolac (TORADOL) injection 15 mg (15 mg Intravenous Given 4/16/24 1520)   iohexol (OMNIPAQUE) 350 MG/ML injection (MULTI-DOSE) 100 mL (100 mL Intravenous Given 4/16/24 1618)       Diagnostic Studies  Results Reviewed       Procedure Component Value Units Date/Time    Urine Microscopic [[id number]] Collected: 04/16/24 1709    Lab Status: In process Specimen: Urine, Clean Catch Updated: 04/16/24 1716    Urine Macroscopic, POC [[id number]]  (Abnormal) Collected: 04/16/24 1709    Lab Status: Final result Specimen: Urine Updated: 04/16/24 1710     Color, UA Yellow     Clarity, UA Clear     pH, UA 7.0     Leukocytes, UA Negative     Nitrite, UA Negative     Protein, UA Negative mg/dl      Glucose, UA Negative mg/dl      Ketones, UA Negative mg/dl      Urobilinogen, UA 0.2 E.U./dl      Bilirubin, UA Negative     Occult Blood, UA Large     Specific Gravity, UA 1.015    Narrative:      CLINITEK RESULT    TSH, 3rd generation with Free T4 reflex [[id number]]  (Normal) Collected: 04/16/24 1502    Lab Status: Final result Specimen: Blood from Arm, Right Updated: 04/16/24 1547     TSH 3RD GENERATON 1.575 uIU/mL     Comprehensive metabolic panel [[id number]]  (Abnormal) Collected: 04/16/24 1502    Lab Status: Final result Specimen: Blood from Arm, Right Updated: 04/16/24 1535     Sodium 139 mmol/L      Potassium 3.6 mmol/L      Chloride 106 mmol/L      CO2 27 mmol/L      ANION GAP 6 mmol/L      BUN 11 mg/dL      Creatinine 0.76 mg/dL      Glucose 109 mg/dL      Calcium 8.9 mg/dL      AST 14 U/L      ALT 6 U/L      Alkaline Phosphatase 78 U/L      Total Protein 7.6 g/dL      Albumin 4.2 g/dL      Total Bilirubin 0.27 mg/dL      eGFR 120 ml/min/1.73sq m     Narrative:      National  Kidney Disease Foundation guidelines for Chronic Kidney Disease (CKD):     Stage 1 with normal or high GFR (GFR > 90 mL/min/1.73 square meters)    Stage 2 Mild CKD (GFR = 60-89 mL/min/1.73 square meters)    Stage 3A Moderate CKD (GFR = 45-59 mL/min/1.73 square meters)    Stage 3B Moderate CKD (GFR = 30-44 mL/min/1.73 square meters)    Stage 4 Severe CKD (GFR = 15-29 mL/min/1.73 square meters)    Stage 5 End Stage CKD (GFR <15 mL/min/1.73 square meters)  Note: GFR calculation is accurate only with a steady state creatinine    Lipase [[id number]]  (Normal) Collected: 04/16/24 1502    Lab Status: Final result Specimen: Blood from Arm, Right Updated: 04/16/24 1535     Lipase 12 u/L     Magnesium [[id number]]  (Normal) Collected: 04/16/24 1502    Lab Status: Final result Specimen: Blood from Arm, Right Updated: 04/16/24 1535     Magnesium 1.9 mg/dL     Phosphorus [[id number]]  (Normal) Collected: 04/16/24 1502    Lab Status: Final result Specimen: Blood from Arm, Right Updated: 04/16/24 1535     Phosphorus 3.0 mg/dL     CBC and differential [[id number]] Collected: 04/16/24 1502    Lab Status: Final result Specimen: Blood from Arm, Right Updated: 04/16/24 1510     WBC 10.12 Thousand/uL      RBC 4.35 Million/uL      Hemoglobin 13.7 g/dL      Hematocrit 40.4 %      MCV 93 fL      MCH 31.5 pg      MCHC 33.9 g/dL      RDW 13.4 %      MPV 9.2 fL      Platelets 218 Thousands/uL      nRBC 0 /100 WBCs      Segmented % 68 %      Immature Grans % 0 %      Lymphocytes % 22 %      Monocytes % 6 %      Eosinophils Relative 4 %      Basophils Relative 0 %      Absolute Neutrophils 6.77 Thousands/µL      Absolute Immature Grans 0.02 Thousand/uL      Absolute Lymphocytes 2.26 Thousands/µL      Absolute Monocytes 0.63 Thousand/µL      Eosinophils Absolute 0.40 Thousand/µL      Basophils Absolute 0.04 Thousands/µL                    CT abdomen pelvis with contrast   Final Result by Samuel Lopez MD (04/16 1633)      Mild right  periureteral stranding with 2 mm bladder calculus, findings likely representing recently passed right ureteral calculus. No hydronephrosis.         Workstation performed: [id number]                    Procedures  ECG 12 Lead Documentation Only    Date/Time: 4/16/2024 4:21 PM    Performed by: Constantin Muhammad MD  Authorized by: Constantin Muhammad MD    ECG reviewed by me, the ED Provider: yes    Patient location:  ED  Rate:     ECG rate:  40    ECG rate assessment: bradycardic    Rhythm:     Rhythm: sinus bradycardia    Ectopy:     Ectopy: none    QRS:     QRS axis:  Normal    QRS intervals:  Normal  Conduction:     Conduction: normal    ST segments:     ST segments:  Normal  T waves:     T waves: normal    Other findings:     Other findings: early repolarization             ED Course  ED Course as of 04/16/24 1721   Tue Apr 16, 2024   1655 CT abdomen pelvis with contrast   1714 Patient currently asymptomatic.  Heart rate has improved.  Patient's pain likely secondary to passed kidney stone.  Will reassure, , treat symptoms, discharged home with outpatient follow-up.                               SBIRT 20yo+      Flowsheet Row Most Recent Value   Initial Alcohol Screen: US AUDIT-C     1. How often do you have a drink containing alcohol? 0 Filed at: 04/16/2024 1651   2. How many drinks containing alcohol do you have on a typical day you are drinking?  0 Filed at: 04/16/2024 1651   3a. Male UNDER 65: How often do you have five or more drinks on one occasion? 0 Filed at: 04/16/2024 1651   Audit-C Score 0 Filed at: 04/16/2024 1651   KYLE: How many times in the past year have you...    Used an illegal drug or used a prescription medication for non-medical reasons? Never Filed at: 04/16/2024 1651                      Medical Decision Making  Acute abdominal pain-will check abdominal labs to rule out hepatitis pancreatitis, acute kidney injury, to abnormality, CT abdomen pelvis for acute surgical pathology such as  but not limited to perforated viscus, cholecystitis, appendicitis, small bowel obstruction,.  Medication, and fluids, reassess    Bradycardia will do EKG to rule out dysrhythmia, labs for light abnormality, TSH or thyroid dysfunction    Amount and/or Complexity of Data Reviewed  Labs: ordered.  Radiology: ordered. Decision-making details documented in ED Course.    Risk  Prescription drug management.             Disposition  Final diagnoses:   Acute abdominal pain   Hematuria   Sinus bradycardia   Kidney stone   Lightheadedness     Time reflects when diagnosis was documented in both MDM as applicable and the Disposition within this note       Time User Action Codes Description Comment    4/16/2024  5:16 PM Hosak, Constantin J Add [R10.9] Acute abdominal pain     4/16/2024  5:16 PM Hosak, Constantin J Add [R31.9] Hematuria     4/16/2024  5:16 PM Hosak, Constantin J Add [R00.1] Sinus bradycardia     4/16/2024  5:16 PM Hosak, Constantin J Add [N20.0] Kidney stone     4/16/2024  5:16 PM Hosak, Constantin J Add [R42] Lightheadedness           ED Disposition       ED Disposition   Discharge    Condition   Stable    Date/Time   Tue Apr 16, 2024 1715    Comment   Pradipdenise Elizalde discharge to home/self care.                   Follow-up Information       Follow up With Specialties Details Why Contact Info Additional Information    Chiqui Jeffrey PA-C Family Medicine Schedule an appointment as soon as possible for a visit in 2 days  [address] [place] [phone]  [phone]       [facility] Schedule an appointment as soon as possible for a visit in 2 days  [address] [place] [id number] [phone] [facility] [address], [place], [place], [address] [phone]            Patient's Medications   Discharge Prescriptions    KETOROLAC (TORADOL) 10 MG TABLET    Take 1 tablet (10 mg total) by mouth every 6 (six)  hours as needed for moderate pain for up to 15 doses       Start Date: 4/16/2024 End Date: --       Order Dose: 10 mg       Quantity: 15 tablet    Refills: 0    ONDANSETRON (ZOFRAN) 4 MG TABLET    Take 1 tablet (4 mg total) by mouth every 8 (eight) hours as needed for nausea or vomiting for up to 7 doses       Start Date: 4/16/2024 End Date: --       Order Dose: 4 mg       Quantity: 7 tablet    Refills: 0       No discharge procedures on file.    PDMP Review       None            ED Provider  Electronically Signed by             Constantin Muhammad MD  04/16/24 [id number]

## 2024-04-16 NOTE — ED NOTES
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     Jordon Colvin, RN  04/16/24 [id number]

## 2024-10-15 ENCOUNTER — APPOINTMENT (EMERGENCY)
Dept: RADIOLOGY | Facility: HOSPITAL | Age: 34
End: 2024-10-15
Payer: COMMERCIAL

## 2024-10-15 ENCOUNTER — HOSPITAL ENCOUNTER (EMERGENCY)
Facility: HOSPITAL | Age: 34
Discharge: HOME | End: 2024-10-15
Attending: EMERGENCY MEDICINE
Payer: COMMERCIAL

## 2024-10-15 VITALS
RESPIRATION RATE: 18 BRPM | OXYGEN SATURATION: 98 % | SYSTOLIC BLOOD PRESSURE: 133 MMHG | TEMPERATURE: 97.9 F | WEIGHT: 162.48 LBS | BODY MASS INDEX: 25.45 KG/M2 | DIASTOLIC BLOOD PRESSURE: 76 MMHG | HEART RATE: 67 BPM

## 2024-10-15 DIAGNOSIS — M79.641 RIGHT HAND PAIN: ICD-10-CM

## 2024-10-15 DIAGNOSIS — M25.511 ACUTE PAIN OF RIGHT SHOULDER: ICD-10-CM
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
## 2024-10-15 PROCEDURE — 99284 EMERGENCY DEPT VISIT MOD MDM: CPT | Performed by: PHYSICIAN ASSISTANT

## 2024-10-15 PROCEDURE — 73030 X-RAY EXAM OF SHOULDER: CPT

## 2024-10-15 PROCEDURE — 73110 X-RAY EXAM OF WRIST: CPT

## 2024-10-15 PROCEDURE — 99283 EMERGENCY DEPT VISIT LOW MDM: CPT

## 2024-10-15 RX ORDER — CYCLOBENZAPRINE HCL 10 MG
10 TABLET ORAL 3 TIMES DAILY PRN
Qty: 15 TABLET | Refills: 0 | Status: SHIPPED | OUTPATIENT
Start: 2024-10-15

## 2024-10-15 RX ORDER — ACETAMINOPHEN 500 MG
1000 TABLET ORAL EVERY 6 HOURS PRN
Qty: 30 TABLET | Refills: 0 | Status: SHIPPED | OUTPATIENT
Start: 2024-10-15

## 2024-10-15 RX ORDER — PREDNISONE 10 MG/1
40 TABLET ORAL DAILY
Qty: 16 TABLET | Refills: 0 | Status: SHIPPED | OUTPATIENT
Start: 2024-10-15 | End: 2024-10-19

## 2024-10-15 NOTE — DISCHARGE INSTRUCTIONS
Follow-up with orthopedics.  Take the prednisone to help with inflammation.  Flexeril as needed for muscle spasms.  Tylenol as needed for pain.    If pain worsens present back to the ED.
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
## 2024-10-15 NOTE — Clinical Note
Pradip Elizalde was seen and treated in our emergency department on 10/15/2024.    No restrictions            Diagnosis:     Pradip  may return to work on return date.    He may return on this date: 10/18/2024         If you have any questions or concerns, please don't hesitate to call.      Massiel Torres PA-C    ______________________________           _______________          _______________  Hospital Representative                              Date                                Time

## 2024-10-25 ENCOUNTER — TELEPHONE (OUTPATIENT)
Dept: OBGYN CLINIC | Facility: HOSPITAL | Age: 34
End: 2024-10-25

## 2024-10-25 NOTE — TELEPHONE ENCOUNTER
Caller: Kaelyn [employer]str[employer]    Reason for call: [employer]    DOI: 10/04/2024    CLAIM# [id number]    : Kaelyn    Phone# [phone]     Fax# [phone]    Bill Claims To:    [address] [address]  [place], [address]

## 2024-10-28 ENCOUNTER — OFFICE VISIT (OUTPATIENT)
Dept: OBGYN CLINIC | Facility: MEDICAL CENTER | Age: 34
End: 2024-10-28
Payer: OTHER MISCELLANEOUS

## 2024-10-28 VITALS
DIASTOLIC BLOOD PRESSURE: 65 MMHG | BODY MASS INDEX: 25.9 KG/M2 | SYSTOLIC BLOOD PRESSURE: 107 MMHG | HEART RATE: 59 BPM | HEIGHT: 67 IN | WEIGHT: 165 LBS

## 2024-10-28 DIAGNOSIS — M25.511 ACUTE PAIN OF RIGHT SHOULDER: ICD-10-CM

## 2024-10-28 DIAGNOSIS — M25.311 SHOULDER INSTABILITY, RIGHT: ICD-10-CM

## 2024-10-28 DIAGNOSIS — S41.131A GUNSHOT WOUND OF RIGHT UPPER ARM, INITIAL ENCOUNTER: Primary | ICD-10-CM

## 2024-10-28 DIAGNOSIS — M54.12 RADICULOPATHY, CERVICAL REGION: ICD-10-CM

## 2024-10-28 PROCEDURE — 99203 OFFICE O/P NEW LOW 30 MIN: CPT | Performed by: ORTHOPAEDIC SURGERY

## 2024-10-28 RX ORDER — METHYLPREDNISOLONE 4 MG/1
TABLET ORAL
Qty: 21 EACH | Refills: 0 | Status: SHIPPED | OUTPATIENT
Start: 2024-10-28

## 2024-10-28 RX ORDER — METAXALONE 400 MG/1
400 TABLET ORAL 3 TIMES DAILY PRN
COMMUNITY
Start: 2024-10-05
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
## 2024-10-29 ENCOUNTER — TELEPHONE (OUTPATIENT)
Age: 34
End: 2024-10-29

## 2024-10-29 NOTE — TELEPHONE ENCOUNTER
Caller: WGEORGE    Doctor: Tyler     Reason for call: Asked for office note from 10/28, all was sent   Attention-Kaelyn   Fax- [phone]

## 2024-12-12 ENCOUNTER — CONSULT (OUTPATIENT)
Dept: PAIN MEDICINE | Facility: MEDICAL CENTER | Age: 34
End: 2024-12-12
Payer: OTHER MISCELLANEOUS

## 2024-12-12 VITALS
BODY MASS INDEX: 27.31 KG/M2 | DIASTOLIC BLOOD PRESSURE: 89 MMHG | WEIGHT: 174 LBS | HEART RATE: 61 BPM | HEIGHT: 67 IN | SYSTOLIC BLOOD PRESSURE: 153 MMHG

## 2024-12-12 DIAGNOSIS — S41.131A GUNSHOT WOUND OF RIGHT UPPER ARM, INITIAL ENCOUNTER: ICD-10-CM

## 2024-12-12 DIAGNOSIS — M54.12 RADICULOPATHY, CERVICAL REGION: Primary | ICD-10-CM

## 2024-12-12 PROCEDURE — 99244 OFF/OP CNSLTJ NEW/EST MOD 40: CPT | Performed by: PHYSICAL MEDICINE & REHABILITATION
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
## 2025-04-15 ENCOUNTER — HOSPITAL ENCOUNTER (OUTPATIENT)
Dept: NEUROLOGY | Facility: CLINIC | Age: 35
Discharge: HOME | End: 2025-04-15
Payer: COMMERCIAL

## 2025-04-15 DIAGNOSIS — M54.12 RADICULOPATHY, CERVICAL REGION: ICD-10-CM

## 2025-04-15 PROBLEM — R20.0 NUMBNESS: Status: ACTIVE | Noted: 2025-04-15

## 2025-04-15 PROCEDURE — 95886 MUSC TEST DONE W/N TEST COMP: CPT | Performed by: PSYCHIATRY & NEUROLOGY

## 2025-04-15 PROCEDURE — 95909 NRV CNDJ TST 5-6 STUDIES: CPT | Performed by: PSYCHIATRY & NEUROLOGY

## 2025-04-17 ENCOUNTER — RESULTS FOLLOW-UP (OUTPATIENT)
Dept: PAIN MEDICINE | Facility: MEDICAL CENTER | Age: 35
End: 2025-04-17

## 2025-04-17 DIAGNOSIS — M54.12 RIGHT CERVICAL RADICULOPATHY: Primary | ICD-10-CM

## 2025-04-17 NOTE — TELEPHONE ENCOUNTER
----- Message from Peter Milan DO sent at 4/17/2025  3:56 PM EDT -----  EMG was normal.  Would recommend cervical spine MRI to determine if any nerve root compression is occurring in the cervical region to account for his symptoms.  If agreeable to MRI I will place the order for him.

## 2025-04-17 NOTE — TELEPHONE ENCOUNTER
S/w the patient and reviewed. He was agreeable in having the cervical MRI. He appreciated the call. Central scheduling phone number provided.